# Patient Record
Sex: FEMALE | Race: WHITE | Employment: OTHER | URBAN - METROPOLITAN AREA
[De-identification: names, ages, dates, MRNs, and addresses within clinical notes are randomized per-mention and may not be internally consistent; named-entity substitution may affect disease eponyms.]

---

## 2017-01-23 ENCOUNTER — TELEPHONE (OUTPATIENT)
Dept: INTERNAL MEDICINE CLINIC | Facility: CLINIC | Age: 77
End: 2017-01-23

## 2017-04-05 ENCOUNTER — OFFICE VISIT (OUTPATIENT)
Dept: INTERNAL MEDICINE CLINIC | Facility: CLINIC | Age: 77
End: 2017-04-05

## 2017-04-05 VITALS
WEIGHT: 175 LBS | HEIGHT: 63.75 IN | RESPIRATION RATE: 18 BRPM | OXYGEN SATURATION: 95 % | HEART RATE: 56 BPM | SYSTOLIC BLOOD PRESSURE: 118 MMHG | BODY MASS INDEX: 30.25 KG/M2 | DIASTOLIC BLOOD PRESSURE: 78 MMHG

## 2017-04-05 DIAGNOSIS — Z00.00 MEDICARE ANNUAL WELLNESS VISIT, SUBSEQUENT: Primary | ICD-10-CM

## 2017-04-05 DIAGNOSIS — M54.16 LUMBAR RADICULOPATHY: ICD-10-CM

## 2017-04-05 DIAGNOSIS — L30.8 OTHER ECZEMA: ICD-10-CM

## 2017-04-05 PROCEDURE — G0439 PPPS, SUBSEQ VISIT: HCPCS | Performed by: INTERNAL MEDICINE

## 2017-04-05 PROCEDURE — 96160 PT-FOCUSED HLTH RISK ASSMT: CPT | Performed by: INTERNAL MEDICINE

## 2017-04-05 PROCEDURE — 80061 LIPID PANEL: CPT | Performed by: INTERNAL MEDICINE

## 2017-04-05 PROCEDURE — 82306 VITAMIN D 25 HYDROXY: CPT | Performed by: INTERNAL MEDICINE

## 2017-04-05 PROCEDURE — 85027 COMPLETE CBC AUTOMATED: CPT | Performed by: INTERNAL MEDICINE

## 2017-04-05 PROCEDURE — 80053 COMPREHEN METABOLIC PANEL: CPT | Performed by: INTERNAL MEDICINE

## 2017-04-05 PROCEDURE — 84443 ASSAY THYROID STIM HORMONE: CPT | Performed by: INTERNAL MEDICINE

## 2017-04-05 RX ORDER — CLOBETASOL PROPIONATE 0.5 MG/G
CREAM TOPICAL
Qty: 30 G | Refills: 6 | Status: SHIPPED | OUTPATIENT
Start: 2017-04-05 | End: 2018-06-03

## 2017-04-05 NOTE — PROGRESS NOTES
HPI:   Indiana Munroe is a 68year old female who presents for a MA Supervisit. Patient Active Problem List:     Eczema Clobetasol topically     Arthritis generalized - Tylenol prn for pain HEP for lower back recommended.       General Health     In the 0 - 3 No Risk     Depression Screening (PHQ-2/PHQ-9): Over the LAST 2 WEEKS   Little interest or pleasure in doing things (over the last two weeks)?: Not at all    Feeling down, depressed, or hopeless (over the last two weeks)?: Not at all    PHQ-2 SCORE: and diffuse arthralgiasNEURO: denies headaches  PSYCHE: denies depression or anxiety  HEMATOLOGIC: denies hx of anemia  ENDOCRINE: denies thyroid history  ALL/ASTHMA: denies hx of allergy or asthma    EXAM:   /78 mmHg  Pulse 56  Resp 18  Ht 63.75\" patient indicates understanding of these issues and agrees to the plan.   The patient is asked to return in 6m for fu     [address] Internal Lab or Procedure External Lab or Procedure   Diabetes Screening      HbgA1C any previous visit.  Update Immunization Activity if applicable        SPECIFIC DISEASE MONITORING Internal Lab or Procedure External Lab or Procedure   Annual Monitoring of Persistent     Medications (ACE/ARB, digoxin diuretics, anticonvulsants.)    Aria Risk 3 scheduled doses No orders found for this or any previous visit. SUGGESTED VACCINATIONS - Influenza, Pneumococcal, Zoster, Tetanus     There is no immunization history on file for this patient.     HPI:    Patient ID: Peg Manual is a 68 yea

## 2018-02-19 ENCOUNTER — OFFICE VISIT (OUTPATIENT)
Dept: INTERNAL MEDICINE CLINIC | Facility: CLINIC | Age: 78
End: 2018-02-19

## 2018-02-19 VITALS
WEIGHT: 180.81 LBS | DIASTOLIC BLOOD PRESSURE: 82 MMHG | TEMPERATURE: 99 F | OXYGEN SATURATION: 98 % | RESPIRATION RATE: 18 BRPM | HEIGHT: 63.75 IN | BODY MASS INDEX: 31.25 KG/M2 | SYSTOLIC BLOOD PRESSURE: 164 MMHG | HEART RATE: 65 BPM

## 2018-02-19 DIAGNOSIS — I10 ESSENTIAL HYPERTENSION: Primary | ICD-10-CM

## 2018-02-19 DIAGNOSIS — M77.31 HEEL SPUR, RIGHT: ICD-10-CM

## 2018-02-19 PROCEDURE — 99214 OFFICE O/P EST MOD 30 MIN: CPT | Performed by: INTERNAL MEDICINE

## 2018-02-19 PROCEDURE — 20552 NJX 1/MLT TRIGGER POINT 1/2: CPT | Performed by: INTERNAL MEDICINE

## 2018-02-19 RX ORDER — METHYLPREDNISOLONE ACETATE 40 MG/ML
40 INJECTION, SUSPENSION INTRA-ARTICULAR; INTRALESIONAL; INTRAMUSCULAR; SOFT TISSUE ONCE
Status: COMPLETED | OUTPATIENT
Start: 2018-02-19 | End: 2018-02-19

## 2018-02-19 RX ORDER — LOSARTAN POTASSIUM AND HYDROCHLOROTHIAZIDE 12.5; 5 MG/1; MG/1
1 TABLET ORAL DAILY
Qty: 30 TABLET | Refills: 6 | Status: SHIPPED | OUTPATIENT
Start: 2018-02-19 | End: 2018-10-19

## 2018-02-19 RX ADMIN — METHYLPREDNISOLONE ACETATE 40 MG: 40 INJECTION, SUSPENSION INTRA-ARTICULAR; INTRALESIONAL; INTRAMUSCULAR; SOFT TISSUE at 15:49:00

## 2018-02-19 NOTE — PROGRESS NOTES
HPI:    Patient ID: Rhonda Jenkins is a 68year old female. Pt here for evaluation of elevated BP reading which began after the death of spuse to chronic illness. Has feeling of headache when BP is elevated.   Also has localized right heel pain during am Neck: Normal range of motion. Neck supple. Cardiovascular: Normal rate and regular rhythm. Pulmonary/Chest: Effort normal and breath sounds normal.   Abdominal: Soft. Bowel sounds are normal. There is no tenderness.    Musculoskeletal: Normal range o

## 2018-04-11 ENCOUNTER — TELEPHONE (OUTPATIENT)
Dept: INTERNAL MEDICINE CLINIC | Facility: CLINIC | Age: 78
End: 2018-04-11

## 2018-04-13 ENCOUNTER — OFFICE VISIT (OUTPATIENT)
Dept: INTERNAL MEDICINE CLINIC | Facility: CLINIC | Age: 78
End: 2018-04-13

## 2018-04-13 VITALS
SYSTOLIC BLOOD PRESSURE: 126 MMHG | BODY MASS INDEX: 30.94 KG/M2 | RESPIRATION RATE: 17 BRPM | HEART RATE: 68 BPM | WEIGHT: 179 LBS | HEIGHT: 63.75 IN | DIASTOLIC BLOOD PRESSURE: 76 MMHG | OXYGEN SATURATION: 98 % | TEMPERATURE: 98 F

## 2018-04-13 DIAGNOSIS — Z00.00 MEDICARE ANNUAL WELLNESS VISIT, SUBSEQUENT: Primary | ICD-10-CM

## 2018-04-13 DIAGNOSIS — L30.8 OTHER ECZEMA: ICD-10-CM

## 2018-04-13 DIAGNOSIS — I10 ESSENTIAL HYPERTENSION: ICD-10-CM

## 2018-04-13 DIAGNOSIS — Z12.31 SCREENING MAMMOGRAM, ENCOUNTER FOR: ICD-10-CM

## 2018-04-13 PROCEDURE — 80061 LIPID PANEL: CPT | Performed by: INTERNAL MEDICINE

## 2018-04-13 PROCEDURE — 96160 PT-FOCUSED HLTH RISK ASSMT: CPT | Performed by: INTERNAL MEDICINE

## 2018-04-13 PROCEDURE — 84443 ASSAY THYROID STIM HORMONE: CPT | Performed by: INTERNAL MEDICINE

## 2018-04-13 PROCEDURE — 36415 COLL VENOUS BLD VENIPUNCTURE: CPT | Performed by: INTERNAL MEDICINE

## 2018-04-13 PROCEDURE — 80053 COMPREHEN METABOLIC PANEL: CPT | Performed by: INTERNAL MEDICINE

## 2018-04-13 PROCEDURE — 85025 COMPLETE CBC W/AUTO DIFF WBC: CPT | Performed by: INTERNAL MEDICINE

## 2018-04-13 PROCEDURE — G0439 PPPS, SUBSEQ VISIT: HCPCS | Performed by: INTERNAL MEDICINE

## 2018-04-13 NOTE — PROGRESS NOTES
HPI:    Patient ID: Kiel Baca is a 68year old female. Pt here o MA supervisit and fu on elevated bp and eczema. Has been feeling Sad but not depressed overtly. HPI:   Kiel Baca is a 68year old female who presents for a MA Supervisit.     Pa the last two weeks)?: Not at all    Feeling down, depressed, or hopeless (over the last two weeks)?: Not at all    PHQ-2 SCORE: 0        Advance Directives     Do you have a healthcare power of ?: No    Do you have a living will?: No   Was Medicare incontinence   MUSCULOSKELETAL: denies back pain generalized joint pains  NEURO: denies headaches  PSYCHE: denies depression or anxiety  HEMATOLOGIC: denies hx of anemia  ENDOCRINE: denies thyroid history  ALL/ASTHMA:seassonal allergies or asthma    EXAM: cream and Clobetasol topically bid         The patient indicates understanding of these issues and agrees to the plan.   The patient is asked to return in 5m for fu       [address] Internal Lab or Procedure External Lab Hepatitis B No orders found for this or any previous visit. Update Immunization Activity if applicable    Tetanus No orders found for this or any previous visit.  Update Immunization Activity if applicable        SPECIFIC DISEASE MONITORING Internal Lab or display for this patient. Mammogram Age > 39 q1 year There are no preventive care reminders to display for this patient.    EKG All Patients One at initial exam    Vaccines:      Pneumococcal All Patients Once per lifetime No orders found for this or any

## 2018-04-13 NOTE — PROGRESS NOTES
Pt's  verified  Pt presented for a fasting blood draw. Per DR Rissa Lopez ordered TSHR CBC CMP LIPID. Pt tolerated venipuncture well,specimens drawn from RT  arm  and sent out to [address] [place] [address] lab for testing.

## 2018-04-16 RX ORDER — ATORVASTATIN CALCIUM 10 MG/1
10 TABLET, FILM COATED ORAL DAILY
Qty: 90 TABLET | Refills: 3 | Status: SHIPPED | OUTPATIENT
Start: 2018-04-16 | End: 2019-04-11

## 2018-05-01 RX ORDER — LISINOPRIL 10 MG/1
10 TABLET ORAL DAILY
Qty: 30 TABLET | Refills: 3 | Status: SHIPPED | OUTPATIENT
Start: 2018-05-01 | End: 2018-09-05

## 2018-06-01 RX ORDER — CLOBETASOL PROPIONATE 0.5 MG/ML
LOTION TOPICAL
Qty: 1 BOTTLE | Refills: 1 | Status: SHIPPED | OUTPATIENT
Start: 2018-06-01

## 2018-06-04 RX ORDER — CLOBETASOL PROPIONATE 0.5 MG/G
EMULSION TOPICAL
Qty: 30 G | Refills: 0 | Status: SHIPPED | OUTPATIENT
Start: 2018-06-04 | End: 2018-10-19

## 2018-06-05 ENCOUNTER — TELEPHONE (OUTPATIENT)
Dept: INTERNAL MEDICINE CLINIC | Facility: CLINIC | Age: 78
End: 2018-06-05

## 2018-06-05 RX ORDER — CLOBETASOL PROPIONATE 0.5 MG/G
CREAM TOPICAL
Qty: 30 G | Refills: 0 | Status: CANCELLED | OUTPATIENT
Start: 2018-06-05

## 2018-09-05 RX ORDER — LISINOPRIL 10 MG/1
TABLET ORAL
Qty: 30 TABLET | Refills: 0 | Status: SHIPPED | OUTPATIENT
Start: 2018-09-05 | End: 2018-10-04

## 2018-09-29 ENCOUNTER — HOSPITAL ENCOUNTER (OUTPATIENT)
Dept: MAMMOGRAPHY | Facility: HOSPITAL | Age: 78
Discharge: HOME | End: 2018-09-29
Attending: INTERNAL MEDICINE
Payer: MEDICARE

## 2018-09-29 DIAGNOSIS — Z12.31 SCREENING MAMMOGRAM, ENCOUNTER FOR: ICD-10-CM

## 2018-09-29 PROCEDURE — 77063 BREAST TOMOSYNTHESIS BI: CPT | Performed by: INTERNAL MEDICINE

## 2018-09-29 PROCEDURE — 77067 SCR MAMMO BI INCL CAD: CPT | Performed by: INTERNAL MEDICINE

## 2018-10-04 RX ORDER — LISINOPRIL 10 MG/1
TABLET ORAL
Qty: 30 TABLET | Refills: 0 | Status: SHIPPED | OUTPATIENT
Start: 2018-10-04 | End: 2018-10-19

## 2018-10-19 ENCOUNTER — OFFICE VISIT (OUTPATIENT)
Dept: INTERNAL MEDICINE CLINIC | Facility: CLINIC | Age: 78
End: 2018-10-19

## 2018-10-19 VITALS
WEIGHT: 181 LBS | OXYGEN SATURATION: 99 % | DIASTOLIC BLOOD PRESSURE: 70 MMHG | TEMPERATURE: 98 F | SYSTOLIC BLOOD PRESSURE: 120 MMHG | HEART RATE: 62 BPM | HEIGHT: 64 IN | BODY MASS INDEX: 30.9 KG/M2

## 2018-10-19 DIAGNOSIS — I10 ESSENTIAL HYPERTENSION: Primary | ICD-10-CM

## 2018-10-19 DIAGNOSIS — E78.2 MIXED HYPERLIPIDEMIA: ICD-10-CM

## 2018-10-19 DIAGNOSIS — L30.8 OTHER ECZEMA: ICD-10-CM

## 2018-10-19 PROCEDURE — G0008 ADMIN INFLUENZA VIRUS VAC: HCPCS | Performed by: INTERNAL MEDICINE

## 2018-10-19 PROCEDURE — 80061 LIPID PANEL: CPT | Performed by: INTERNAL MEDICINE

## 2018-10-19 PROCEDURE — 80053 COMPREHEN METABOLIC PANEL: CPT | Performed by: INTERNAL MEDICINE

## 2018-10-19 PROCEDURE — 99214 OFFICE O/P EST MOD 30 MIN: CPT | Performed by: INTERNAL MEDICINE

## 2018-10-19 PROCEDURE — 90653 IIV ADJUVANT VACCINE IM: CPT | Performed by: INTERNAL MEDICINE

## 2018-10-19 RX ORDER — CLOBETASOL PROPIONATE 0.5 MG/G
CREAM TOPICAL
Qty: 30 G | Refills: 3 | Status: SHIPPED | OUTPATIENT
Start: 2018-10-19 | End: 2019-09-27

## 2018-10-19 RX ORDER — LOSARTAN POTASSIUM AND HYDROCHLOROTHIAZIDE 12.5; 5 MG/1; MG/1
1 TABLET ORAL DAILY
Qty: 90 TABLET | Refills: 3 | Status: SHIPPED | OUTPATIENT
Start: 2018-10-19 | End: 2018-10-19 | Stop reason: CLARIF

## 2018-10-19 RX ORDER — LISINOPRIL 10 MG/1
10 TABLET ORAL DAILY
Qty: 90 TABLET | Refills: 3 | Status: SHIPPED | OUTPATIENT
Start: 2018-10-19 | End: 2019-09-27

## 2018-10-19 NOTE — PROGRESS NOTES
HPI:    Patient ID: Rhonda Appiah is a 68year old female. Pt here for fu on htn and hyperlipidemia . Has good tolerance of both Losartan and Statin rx. Is planning to travel to [place] in near future. Is due for fu blood work on cholesterol level. mood and affect.               ASSESSMENT/PLAN:   Essential hypertension  (primary encounter diagnosis) controlled with Lisinopril 10mg  Mixed hyperlipidemia  On lipitor recheck labs  Other eczema Triamcinolone topically  Fluad given left deltoid  No orders

## 2019-06-26 RX ORDER — ATORVASTATIN CALCIUM 10 MG/1
TABLET, FILM COATED ORAL
Qty: 90 TABLET | Refills: 1 | Status: SHIPPED | OUTPATIENT
Start: 2019-06-26 | End: 2019-09-27

## 2019-09-13 ENCOUNTER — TELEPHONE (OUTPATIENT)
Dept: INTERNAL MEDICINE CLINIC | Facility: CLINIC | Age: 79
End: 2019-09-13

## 2019-09-13 NOTE — TELEPHONE ENCOUNTER
Patient has an appointment on Friday, 9/27 at 8:30 am with Dr. Chadwick Wright for her annual physical.    She's not sure if she needs to have blood work done before her appointment?

## 2019-09-27 ENCOUNTER — OFFICE VISIT (OUTPATIENT)
Dept: INTERNAL MEDICINE CLINIC | Facility: CLINIC | Age: 79
End: 2019-09-27
Payer: MEDICARE

## 2019-09-27 VITALS
WEIGHT: 177.19 LBS | HEART RATE: 66 BPM | HEIGHT: 63.75 IN | SYSTOLIC BLOOD PRESSURE: 112 MMHG | BODY MASS INDEX: 30.62 KG/M2 | DIASTOLIC BLOOD PRESSURE: 74 MMHG | OXYGEN SATURATION: 98 %

## 2019-09-27 DIAGNOSIS — I10 ESSENTIAL HYPERTENSION: ICD-10-CM

## 2019-09-27 DIAGNOSIS — E78.2 MIXED HYPERLIPIDEMIA: ICD-10-CM

## 2019-09-27 DIAGNOSIS — Z00.00 MEDICARE ANNUAL WELLNESS VISIT, SUBSEQUENT: Primary | ICD-10-CM

## 2019-09-27 DIAGNOSIS — Z01.00 ENCOUNTER FOR COMPLETE EYE EXAM: ICD-10-CM

## 2019-09-27 PROCEDURE — G0439 PPPS, SUBSEQ VISIT: HCPCS | Performed by: INTERNAL MEDICINE

## 2019-09-27 PROCEDURE — 96160 PT-FOCUSED HLTH RISK ASSMT: CPT | Performed by: INTERNAL MEDICINE

## 2019-09-27 PROCEDURE — 99397 PER PM REEVAL EST PAT 65+ YR: CPT | Performed by: INTERNAL MEDICINE

## 2019-09-27 RX ORDER — CLOBETASOL PROPIONATE 0.5 MG/G
CREAM TOPICAL
Qty: 30 G | Refills: 3 | Status: SHIPPED | OUTPATIENT
Start: 2019-09-27 | End: 2021-05-21

## 2019-09-27 RX ORDER — CLOBETASOL PROPIONATE 0.5 MG/G
CREAM TOPICAL
Qty: 30 G | Refills: 3 | Status: SHIPPED | OUTPATIENT
Start: 2019-09-27 | End: 2019-09-27

## 2019-09-27 RX ORDER — LISINOPRIL 10 MG/1
10 TABLET ORAL DAILY
Qty: 90 TABLET | Refills: 3 | Status: SHIPPED | OUTPATIENT
Start: 2019-09-27 | End: 2021-08-11

## 2019-09-27 RX ORDER — ATORVASTATIN CALCIUM 10 MG/1
10 TABLET, FILM COATED ORAL
Qty: 90 TABLET | Refills: 3 | Status: SHIPPED | OUTPATIENT
Start: 2019-09-27 | End: 2019-09-27

## 2019-09-27 RX ORDER — ATORVASTATIN CALCIUM 10 MG/1
10 TABLET, FILM COATED ORAL
Qty: 90 TABLET | Refills: 3 | Status: SHIPPED | OUTPATIENT
Start: 2019-09-27 | End: 2021-02-04

## 2019-09-27 RX ORDER — LISINOPRIL 10 MG/1
10 TABLET ORAL DAILY
Qty: 90 TABLET | Refills: 3 | Status: SHIPPED | OUTPATIENT
Start: 2019-09-27 | End: 2020-09-21

## 2019-09-27 NOTE — PROGRESS NOTES
HPI:    Patient ID: Shwetha Grimes is a 66year old female. HPI Pt here for MA supervisit and fu on OA , lipids and htn. Is generally feeling pretty well except for joint pains. Bp is well controlled.     HPI:   Shwetha Grimes is a 66year old female who Smoker      Smokeless tobacco: Never Used    Alcohol use:  Yes      Alcohol/week: 0.0 standard drinks      Comment: Social    Drug use: No    Occ: retired : yes      REVIEW OF SYSTEMS:   GENERAL: feels well otherwise  SKIN: denies any unusual skin le CHRONIC CONDITIONS:   Slade Matos is a 66year old female who presents for a Medicare Assessment.      PLAN SUMMARY:   Diagnoses and all orders for this visit:    Medicare annual wellness visit, subsequent    Essential hypertension    Mixed hyperlipidemia Persistent     Medications (ACE/ARB, digoxin diuretics, anticonvulsants.)    Potassium  Annually Potassium (mmol/L)   Date Value   10/19/2018 3.8     POTASSIUM (P) (mmol/L)   Date Value   03/07/2016 4.4    No flowsheet data found.     Creatinine  Annually C (primary encounter diagnosis) had labs and mammogram  Essential hypertension controlled  Mixed hyperlipidemia on statin    No orders of the defined types were placed in this encounter.       Meds This Visit:  Requested Prescriptions      No prescriptions re

## 2019-09-28 LAB
ABSOLUTE BASOPHILS: 52 CELLS/UL (ref 0–200)
ABSOLUTE EOSINOPHILS: 142 CELLS/UL (ref 15–500)
ABSOLUTE LYMPHOCYTES: 1707 CELLS/UL (ref 850–3900)
ABSOLUTE MONOCYTES: 400 CELLS/UL (ref 200–950)
ABSOLUTE NEUTROPHILS: 2000 CELLS/UL (ref 1500–7800)
ALBUMIN/GLOBULIN RATIO: 1.7 (CALC) (ref 1–2.5)
ALBUMIN: 4.2 G/DL (ref 3.6–5.1)
ALKALINE PHOSPHATASE: 57 U/L (ref 33–130)
ALT: 21 U/L (ref 6–29)
AST: 19 U/L (ref 10–35)
BASOPHILS: 1.2 %
BILIRUBIN, TOTAL: 1.4 MG/DL (ref 0.2–1.2)
BUN: 16 MG/DL (ref 7–25)
CALCIUM: 9.4 MG/DL (ref 8.6–10.4)
CARBON DIOXIDE: 31 MMOL/L (ref 20–32)
CHLORIDE: 105 MMOL/L (ref 98–110)
CHOL/HDLC RATIO: 3.9 (CALC)
CHOLESTEROL, TOTAL: 208 MG/DL
CREATININE: 0.84 MG/DL (ref 0.6–0.93)
EGFR IF AFRICN AM: 77 ML/MIN/1.73M2
EGFR IF NONAFRICN AM: 67 ML/MIN/1.73M2
EOSINOPHILS: 3.3 %
GLOBULIN: 2.5 G/DL (CALC) (ref 1.9–3.7)
GLUCOSE: 88 MG/DL (ref 65–99)
HDL CHOLESTEROL: 54 MG/DL
HEMATOCRIT: 38.7 % (ref 35–45)
HEMOGLOBIN: 12.6 G/DL (ref 11.7–15.5)
LDL-CHOLESTEROL: 134 MG/DL (CALC)
LYMPHOCYTES: 39.7 %
MCH: 30.4 PG (ref 27–33)
MCHC: 32.6 G/DL (ref 32–36)
MCV: 93.5 FL (ref 80–100)
MONOCYTES: 9.3 %
MPV: 9.8 FL (ref 7.5–12.5)
NEUTROPHILS: 46.5 %
NON-HDL CHOLESTEROL: 154 MG/DL (CALC)
PLATELET COUNT: 212 THOUSAND/UL (ref 140–400)
POTASSIUM: 4.6 MMOL/L (ref 3.5–5.3)
PROTEIN, TOTAL: 6.7 G/DL (ref 6.1–8.1)
RDW: 12.1 % (ref 11–15)
RED BLOOD CELL COUNT: 4.14 MILLION/UL (ref 3.8–5.1)
SODIUM: 140 MMOL/L (ref 135–146)
TRIGLYCERIDES: 94 MG/DL
WHITE BLOOD CELL COUNT: 4.3 THOUSAND/UL (ref 3.8–10.8)

## 2019-11-15 ENCOUNTER — HOSPITAL ENCOUNTER (OUTPATIENT)
Age: 79
Discharge: HOME | End: 2019-11-15
Attending: EMERGENCY MEDICINE
Payer: MEDICARE

## 2019-11-15 VITALS
BODY MASS INDEX: 29.02 KG/M2 | HEIGHT: 64 IN | HEART RATE: 76 BPM | TEMPERATURE: 98 F | RESPIRATION RATE: 16 BRPM | SYSTOLIC BLOOD PRESSURE: 131 MMHG | WEIGHT: 170 LBS | OXYGEN SATURATION: 98 % | DIASTOLIC BLOOD PRESSURE: 68 MMHG

## 2019-11-15 DIAGNOSIS — H10.12 ALLERGIC CONJUNCTIVITIS OF LEFT EYE: Primary | ICD-10-CM

## 2019-11-15 PROCEDURE — 99203 OFFICE O/P NEW LOW 30 MIN: CPT

## 2019-11-15 PROCEDURE — 99213 OFFICE O/P EST LOW 20 MIN: CPT

## 2019-11-15 RX ORDER — KETOTIFEN FUMARATE 0.35 MG/ML
1 SOLUTION/ DROPS OPHTHALMIC 2 TIMES DAILY
Qty: 1 BOTTLE | Refills: 0 | Status: SHIPPED | OUTPATIENT
Start: 2019-11-15

## 2019-11-15 RX ORDER — POLYMYXIN B SULFATE AND TRIMETHOPRIM 1; 10000 MG/ML; [USP'U]/ML
1 SOLUTION OPHTHALMIC EVERY 6 HOURS
Qty: 10 ML | Refills: 0 | Status: SHIPPED | OUTPATIENT
Start: 2019-11-15 | End: 2019-11-20

## 2019-11-15 NOTE — ED PROVIDER NOTES
Patient Seen in: [facility] [address] [place] [address]    History   Patient presents with:  Eye Problem    Stated Complaint: Eye Problem    HPI     Pt complains of red left eye  for 2 days . No trauma. Denies any Uri symptoms. no visual changes. 98 %   O2 Device None (Room air)       Current:/68   Pulse 76   Temp 98.3 °F (36.8 °C) (Oral)   Resp 16   Ht 162.6 cm (5' 4\")   Wt 77.1 kg   SpO2 98%   BMI 29.18 kg/m²     Right Eye Chart Acuity: 20/70, Corrected  Left Eye Chart Acuity: 20/70, Corre

## 2020-03-26 ENCOUNTER — HOSPITAL ENCOUNTER (EMERGENCY)
Facility: HOSPITAL | Age: 80
Discharge: HOME | End: 2020-03-26
Attending: EMERGENCY MEDICINE
Payer: MEDICARE

## 2020-03-26 ENCOUNTER — APPOINTMENT (OUTPATIENT)
Dept: GENERAL RADIOLOGY | Facility: HOSPITAL | Age: 80
End: 2020-03-26
Attending: EMERGENCY MEDICINE
Payer: MEDICARE

## 2020-03-26 VITALS
SYSTOLIC BLOOD PRESSURE: 119 MMHG | TEMPERATURE: 98 F | HEART RATE: 81 BPM | OXYGEN SATURATION: 97 % | HEIGHT: 64 IN | RESPIRATION RATE: 16 BRPM | DIASTOLIC BLOOD PRESSURE: 88 MMHG | BODY MASS INDEX: 29.88 KG/M2 | WEIGHT: 175 LBS

## 2020-03-26 DIAGNOSIS — N30.00 ACUTE CYSTITIS WITHOUT HEMATURIA: Primary | ICD-10-CM

## 2020-03-26 LAB
ALBUMIN SERPL-MCNC: 3.7 G/DL (ref 3.4–5)
ALP LIVER SERPL-CCNC: 70 U/L (ref 55–142)
ALT SERPL-CCNC: 25 U/L (ref 13–56)
ANION GAP SERPL CALC-SCNC: 5 MMOL/L (ref 0–18)
AST SERPL-CCNC: 15 U/L (ref 15–37)
BASOPHILS # BLD AUTO: 0.03 X10(3) UL (ref 0–0.2)
BASOPHILS NFR BLD AUTO: 0.6 %
BILIRUB DIRECT SERPL-MCNC: 0.2 MG/DL (ref 0–0.2)
BILIRUB SERPL-MCNC: 0.9 MG/DL (ref 0.1–2)
BILIRUB UR QL: NEGATIVE
BUN BLD-MCNC: 19 MG/DL (ref 7–18)
BUN/CREAT SERPL: 22.1 (ref 10–20)
CALCIUM BLD-MCNC: 9.4 MG/DL (ref 8.5–10.1)
CHLORIDE SERPL-SCNC: 107 MMOL/L (ref 98–112)
CLARITY UR: CLEAR
CO2 SERPL-SCNC: 27 MMOL/L (ref 21–32)
COLOR UR: COLORLESS
CREAT BLD-MCNC: 0.86 MG/DL (ref 0.55–1.02)
DEPRECATED RDW RBC AUTO: 37.3 FL (ref 35.1–46.3)
EOSINOPHIL # BLD AUTO: 0.1 X10(3) UL (ref 0–0.7)
EOSINOPHIL NFR BLD AUTO: 1.9 %
ERYTHROCYTE [DISTWIDTH] IN BLOOD BY AUTOMATED COUNT: 11.7 % (ref 11–15)
GLUCOSE BLD-MCNC: 96 MG/DL (ref 70–99)
GLUCOSE BLDC GLUCOMTR-MCNC: 102 MG/DL (ref 70–99)
GLUCOSE UR-MCNC: NEGATIVE MG/DL
HCT VFR BLD AUTO: 36.7 % (ref 35–48)
HGB BLD-MCNC: 12.7 G/DL (ref 12–16)
HGB UR QL STRIP.AUTO: NEGATIVE
IMM GRANULOCYTES # BLD AUTO: 0.01 X10(3) UL (ref 0–1)
IMM GRANULOCYTES NFR BLD: 0.2 %
KETONES UR-MCNC: NEGATIVE MG/DL
LYMPHOCYTES # BLD AUTO: 1.92 X10(3) UL (ref 1–4)
LYMPHOCYTES NFR BLD AUTO: 36.7 %
M PROTEIN MFR SERPL ELPH: 7.2 G/DL (ref 6.4–8.2)
MCH RBC QN AUTO: 30.8 PG (ref 26–34)
MCHC RBC AUTO-ENTMCNC: 34.6 G/DL (ref 31–37)
MCV RBC AUTO: 89.1 FL (ref 80–100)
MONOCYTES # BLD AUTO: 0.49 X10(3) UL (ref 0.1–1)
MONOCYTES NFR BLD AUTO: 9.4 %
NEUTROPHILS # BLD AUTO: 2.68 X10 (3) UL (ref 1.5–7.7)
NEUTROPHILS # BLD AUTO: 2.68 X10(3) UL (ref 1.5–7.7)
NEUTROPHILS NFR BLD AUTO: 51.2 %
NITRITE UR QL STRIP.AUTO: NEGATIVE
OSMOLALITY SERPL CALC.SUM OF ELEC: 290 MOSM/KG (ref 275–295)
PH UR: 6 [PH] (ref 5–8)
PLATELET # BLD AUTO: 205 10(3)UL (ref 150–450)
POTASSIUM SERPL-SCNC: 3.8 MMOL/L (ref 3.5–5.1)
PROT UR-MCNC: NEGATIVE MG/DL
RBC # BLD AUTO: 4.12 X10(6)UL (ref 3.8–5.3)
RBC #/AREA URNS AUTO: 2 /HPF
SODIUM SERPL-SCNC: 139 MMOL/L (ref 136–145)
SP GR UR STRIP: 1 (ref 1–1.03)
TROPONIN I SERPL-MCNC: <0.045 NG/ML (ref ?–0.04)
UROBILINOGEN UR STRIP-ACNC: <2
WBC # BLD AUTO: 5.2 X10(3) UL (ref 4–11)
WBC #/AREA URNS AUTO: 4 /HPF

## 2020-03-26 PROCEDURE — 82962 GLUCOSE BLOOD TEST: CPT

## 2020-03-26 PROCEDURE — 99284 EMERGENCY DEPT VISIT MOD MDM: CPT

## 2020-03-26 PROCEDURE — 84484 ASSAY OF TROPONIN QUANT: CPT | Performed by: EMERGENCY MEDICINE

## 2020-03-26 PROCEDURE — 85025 COMPLETE CBC W/AUTO DIFF WBC: CPT | Performed by: EMERGENCY MEDICINE

## 2020-03-26 PROCEDURE — 93005 ELECTROCARDIOGRAM TRACING: CPT

## 2020-03-26 PROCEDURE — 93010 ELECTROCARDIOGRAM REPORT: CPT | Performed by: EMERGENCY MEDICINE

## 2020-03-26 PROCEDURE — 81001 URINALYSIS AUTO W/SCOPE: CPT | Performed by: EMERGENCY MEDICINE

## 2020-03-26 PROCEDURE — 80076 HEPATIC FUNCTION PANEL: CPT | Performed by: EMERGENCY MEDICINE

## 2020-03-26 PROCEDURE — 71045 X-RAY EXAM CHEST 1 VIEW: CPT | Performed by: EMERGENCY MEDICINE

## 2020-03-26 PROCEDURE — 80048 BASIC METABOLIC PNL TOTAL CA: CPT | Performed by: EMERGENCY MEDICINE

## 2020-03-26 PROCEDURE — 36415 COLL VENOUS BLD VENIPUNCTURE: CPT

## 2020-03-26 RX ORDER — CEPHALEXIN 500 MG/1
500 CAPSULE ORAL 3 TIMES DAILY
Qty: 15 CAPSULE | Refills: 0 | Status: SHIPPED | OUTPATIENT
Start: 2020-03-26 | End: 2020-03-31

## 2020-03-27 NOTE — ED PROVIDER NOTES
Patient Seen in: [facility] Emergency Department    History   Patient presents with:  Fatigue    Stated Complaint:     HPI    Patient complains of gen malaise and weakness for wfew days. No fever. No cp. No sob. No abd pain . no rash. Jeremiah Alicea Current:/88   Pulse 81   Temp 97.9 °F (36.6 °C) (Oral)   Resp 16   Ht 162.6 cm (5' 4\")   Wt 79.4 kg   SpO2 97%   BMI 30.04 kg/m²    PULSE OX Nl on room air    GENERAL: awake alert no sig distress  HEAD: normocephalic, atraumatic,   EYES: PERRL DIFFERENTIAL     EKG    Rate, intervals and axes as noted on EKG Report.   Rate: 80  Rhythm: Sinus Rhythm  Reading: Normal intervals, normal EKG             MDM       Cardiac Monitor: Pulse Readings from Last 1 Encounters:  03/26/20 : 81  , sinus,      Radi for 5 days. , Normal, Disp-15 capsule, R-0    !! cephALEXin 500 MG Oral Cap  Take 1 capsule (500 mg total) by mouth 3 (three) times daily for 5 days. , Normal, Disp-15 capsule, R-0    !! - Potential duplicate medications found. Please discuss with provider.

## 2021-02-02 RX ORDER — ATORVASTATIN CALCIUM 10 MG/1
TABLET, FILM COATED ORAL
Qty: 90 TABLET | Refills: 3 | OUTPATIENT
Start: 2021-02-02

## 2021-02-04 RX ORDER — ATORVASTATIN CALCIUM 10 MG/1
TABLET, FILM COATED ORAL
Qty: 90 TABLET | Refills: 3 | Status: SHIPPED | OUTPATIENT
Start: 2021-02-04

## 2021-03-08 DIAGNOSIS — Z23 NEED FOR VACCINATION: ICD-10-CM

## 2021-05-21 ENCOUNTER — OFFICE VISIT (OUTPATIENT)
Dept: INTERNAL MEDICINE CLINIC | Facility: CLINIC | Age: 81
End: 2021-05-21
Payer: MEDICARE

## 2021-05-21 VITALS
HEART RATE: 63 BPM | WEIGHT: 175.81 LBS | BODY MASS INDEX: 30.02 KG/M2 | DIASTOLIC BLOOD PRESSURE: 74 MMHG | OXYGEN SATURATION: 98 % | SYSTOLIC BLOOD PRESSURE: 124 MMHG | HEIGHT: 64 IN

## 2021-05-21 DIAGNOSIS — Z00.00 MEDICARE ANNUAL WELLNESS VISIT, SUBSEQUENT: Primary | ICD-10-CM

## 2021-05-21 DIAGNOSIS — E78.2 MIXED HYPERLIPIDEMIA: ICD-10-CM

## 2021-05-21 DIAGNOSIS — I10 ESSENTIAL HYPERTENSION: ICD-10-CM

## 2021-05-21 DIAGNOSIS — M47.816 LUMBAR SPONDYLOSIS: ICD-10-CM

## 2021-05-21 DIAGNOSIS — M25.561 ACUTE PAIN OF RIGHT KNEE: ICD-10-CM

## 2021-05-21 PROCEDURE — 3074F SYST BP LT 130 MM HG: CPT | Performed by: INTERNAL MEDICINE

## 2021-05-21 PROCEDURE — G0439 PPPS, SUBSEQ VISIT: HCPCS | Performed by: INTERNAL MEDICINE

## 2021-05-21 PROCEDURE — 96160 PT-FOCUSED HLTH RISK ASSMT: CPT | Performed by: INTERNAL MEDICINE

## 2021-05-21 PROCEDURE — 3078F DIAST BP <80 MM HG: CPT | Performed by: INTERNAL MEDICINE

## 2021-05-21 PROCEDURE — 99397 PER PM REEVAL EST PAT 65+ YR: CPT | Performed by: INTERNAL MEDICINE

## 2021-05-21 PROCEDURE — 3008F BODY MASS INDEX DOCD: CPT | Performed by: INTERNAL MEDICINE

## 2021-05-21 RX ORDER — MELOXICAM 15 MG/1
15 TABLET ORAL DAILY
Qty: 30 TABLET | Refills: 5 | Status: SHIPPED | OUTPATIENT
Start: 2021-05-21

## 2021-05-21 RX ORDER — CLOBETASOL PROPIONATE 0.5 MG/G
CREAM TOPICAL
Qty: 30 G | Refills: 3 | Status: SHIPPED | OUTPATIENT
Start: 2021-05-21

## 2021-05-21 NOTE — PROGRESS NOTES
HPI/Subjective:   Patient ID: Greta Miller is a [de-identified]year old female.   History/Other:   Review of Systems  Current Outpatient Medications   Medication Sig Dispense Refill   • ATORVASTATIN 10 MG Oral Tab TAKE 1 TABLET BY MOUTH EVERY DAY 90 tablet 3   • lisin

## 2021-05-21 NOTE — PROGRESS NOTES
HPI/Subjective:   Patient ID: Nando Brock is a [de-identified]year old female. HPI Pt here for a MA supervisit. Having problems with occasional heartburn and lower back and right knee pains. Has labile bp readings.     HPI:   Nando Brock is a [de-identified]year old female Advance Directives     Do you have a healthcare power of ?: No    Do you have a living will?: No   Was Medicare Assessment Questionnaire completed by patient and sent to HIM for scanning? Yes    Please go to \"Cognitive Assessment\" under Medica nasal congestion, sinus pain or ST  LUNGS: denies shortness of breath with exertion  CARDIOVASCULAR: denies chest pain on exertion  GI: denies abdominal pain, occheartburn  : denies dysuria, vaginal discharge or itching, no complaint of urinary incontine COMP METABOLIC PANEL (14)  -     LIPID PANEL    Lumbar spondylosis  -     XR LUMBAR SPINE (MIN 4 VIEWS) (CPT=72410); Future    Acute pain of right knee  -     XR KNEE ROUTINE (3 VIEWS), RIGHT (CPT=73273);  Future    Other orders  -     Meloxicam 15 MG Oral Screening Mammogram      Mammogram  Annually There are no preventive care reminders to display for this patient. Update Health Maintenance if applicable   Immunizations      Influenza No orders found for this or any previous visit.  Update Immunization Ac 09/27/2019 134 (H)      Colonoscopy All Patients q10 years There are no preventive care reminders to display for this patient.    FBS All Patients q1 year No results found for: GLUCOSE   Glaucoma If at high risk q1 year    Pap If at high risk q1 year Ther (primary encounter diagnosis) check labs  Essential hypertension on lisinopril  Mixed hyperlipidemia on lipitor  Lumbar spondylosis meloxicam  Acute pain of right knee check xray    Orders Placed This Encounter      Comp Metabolic Panel (14) [E]      Lipid

## 2021-05-21 NOTE — PROGRESS NOTES
HPI/Subjective:   Patient ID: Zayda Shankar is a [de-identified]year old female.     HPI    History/Other:   Review of Systems  Current Outpatient Medications   Medication Sig Dispense Refill   • ATORVASTATIN 10 MG Oral Tab TAKE 1 TABLET BY MOUTH EVERY DAY 90 tablet 3

## 2021-05-24 ENCOUNTER — HOSPITAL ENCOUNTER (OUTPATIENT)
Dept: GENERAL RADIOLOGY | Age: 81
Discharge: HOME | End: 2021-05-24
Attending: INTERNAL MEDICINE
Payer: MEDICARE

## 2021-05-24 DIAGNOSIS — M25.561 ACUTE PAIN OF RIGHT KNEE: ICD-10-CM

## 2021-05-24 DIAGNOSIS — M47.816 LUMBAR SPONDYLOSIS: ICD-10-CM

## 2021-05-24 PROCEDURE — 72110 X-RAY EXAM L-2 SPINE 4/>VWS: CPT | Performed by: INTERNAL MEDICINE

## 2021-05-24 PROCEDURE — 73562 X-RAY EXAM OF KNEE 3: CPT | Performed by: INTERNAL MEDICINE

## 2021-05-28 ENCOUNTER — LAB ENCOUNTER (OUTPATIENT)
Dept: LAB | Facility: REFERENCE LAB | Age: 81
End: 2021-05-28
Attending: INTERNAL MEDICINE
Payer: MEDICARE

## 2021-05-28 DIAGNOSIS — Z86.16 HISTORY OF COVID-19: ICD-10-CM

## 2021-05-28 PROCEDURE — 86769 SARS-COV-2 COVID-19 ANTIBODY: CPT

## 2021-05-28 PROCEDURE — 36415 COLL VENOUS BLD VENIPUNCTURE: CPT

## 2021-08-09 NOTE — ED INITIAL ASSESSMENT (HPI)
Pt reports feeling \"woozy in the head\" and feeling like she will \"pass out\". Denies SOB, STEVEN, fevers, cough. Pt is [place] speaking only. Adult daughter at bedside to translate. R/F Liudmila

## 2021-08-11 RX ORDER — LISINOPRIL 10 MG/1
10 TABLET ORAL DAILY
Qty: 90 TABLET | Refills: 0 | Status: SHIPPED | OUTPATIENT
Start: 2021-08-11 | End: 2021-12-09

## 2021-12-08 NOTE — TELEPHONE ENCOUNTER
A refill request was received for:  Requested Prescriptions     Pending Prescriptions Disp Refills   • LISINOPRIL 10 MG Oral Tab [Pharmacy Med Name: LISINOPRIL 10 MG TABLET] 90 tablet 0     Sig: TAKE 1 TABLET BY MOUTH EVERY DAY     Last refill date: 8/11/2

## 2021-12-09 RX ORDER — LISINOPRIL 10 MG/1
TABLET ORAL
Qty: 90 TABLET | Refills: 0 | Status: SHIPPED | OUTPATIENT
Start: 2021-12-09

## 2022-03-09 RX ORDER — LISINOPRIL 10 MG/1
TABLET ORAL
Qty: 90 TABLET | Refills: 0 | Status: SHIPPED | OUTPATIENT
Start: 2022-03-09

## 2022-05-19 ENCOUNTER — OFFICE VISIT (OUTPATIENT)
Dept: INTERNAL MEDICINE CLINIC | Facility: CLINIC | Age: 82
End: 2022-05-19
Payer: MEDICARE

## 2022-05-19 VITALS
WEIGHT: 180 LBS | DIASTOLIC BLOOD PRESSURE: 70 MMHG | SYSTOLIC BLOOD PRESSURE: 122 MMHG | OXYGEN SATURATION: 98 % | HEART RATE: 81 BPM | BODY MASS INDEX: 30.73 KG/M2 | HEIGHT: 64 IN

## 2022-05-19 DIAGNOSIS — Z00.00 MEDICARE ANNUAL WELLNESS VISIT, SUBSEQUENT: Primary | ICD-10-CM

## 2022-05-19 DIAGNOSIS — M25.561 ACUTE PAIN OF RIGHT KNEE: ICD-10-CM

## 2022-05-19 DIAGNOSIS — I10 ESSENTIAL HYPERTENSION: ICD-10-CM

## 2022-05-19 PROCEDURE — 96160 PT-FOCUSED HLTH RISK ASSMT: CPT | Performed by: INTERNAL MEDICINE

## 2022-05-19 PROCEDURE — 3078F DIAST BP <80 MM HG: CPT | Performed by: INTERNAL MEDICINE

## 2022-05-19 PROCEDURE — 3008F BODY MASS INDEX DOCD: CPT | Performed by: INTERNAL MEDICINE

## 2022-05-19 PROCEDURE — 99397 PER PM REEVAL EST PAT 65+ YR: CPT | Performed by: INTERNAL MEDICINE

## 2022-05-19 PROCEDURE — G0439 PPPS, SUBSEQ VISIT: HCPCS | Performed by: INTERNAL MEDICINE

## 2022-05-19 PROCEDURE — 3074F SYST BP LT 130 MM HG: CPT | Performed by: INTERNAL MEDICINE

## 2022-05-19 RX ORDER — AMLODIPINE BESYLATE 2.5 MG/1
2.5 TABLET ORAL DAILY
Qty: 30 TABLET | Refills: 11 | Status: SHIPPED | OUTPATIENT
Start: 2022-05-19

## 2022-05-19 RX ORDER — CLOBETASOL PROPIONATE 0.5 MG/G
CREAM TOPICAL
Qty: 30 G | Refills: 3 | Status: SHIPPED | OUTPATIENT
Start: 2022-05-19

## 2022-05-19 RX ORDER — LISINOPRIL 10 MG/1
10 TABLET ORAL DAILY
Qty: 90 TABLET | Refills: 3 | Status: SHIPPED | OUTPATIENT
Start: 2022-05-19

## 2022-05-23 ENCOUNTER — LAB ENCOUNTER (OUTPATIENT)
Dept: LAB | Facility: REFERENCE LAB | Age: 82
End: 2022-05-23
Attending: INTERNAL MEDICINE
Payer: MEDICARE

## 2022-05-23 DIAGNOSIS — Z00.00 MEDICARE ANNUAL WELLNESS VISIT, SUBSEQUENT: ICD-10-CM

## 2022-05-23 DIAGNOSIS — Z00.00 ROUTINE GENERAL MEDICAL EXAMINATION AT A HEALTH CARE FACILITY: Primary | ICD-10-CM

## 2022-05-23 LAB
ALBUMIN SERPL-MCNC: 3.7 G/DL (ref 3.4–5)
ALBUMIN/GLOB SERPL: 1 {RATIO} (ref 1–2)
ALP LIVER SERPL-CCNC: 55 U/L
ALT SERPL-CCNC: 22 U/L
ANION GAP SERPL CALC-SCNC: 2 MMOL/L (ref 0–18)
AST SERPL-CCNC: 16 U/L (ref 15–37)
BASOPHILS # BLD AUTO: 0.05 X10(3) UL (ref 0–0.2)
BASOPHILS NFR BLD AUTO: 1.2 %
BILIRUB SERPL-MCNC: 1.3 MG/DL (ref 0.1–2)
BUN BLD-MCNC: 14 MG/DL (ref 7–18)
BUN/CREAT SERPL: 16.3 (ref 10–20)
CALCIUM BLD-MCNC: 9.6 MG/DL (ref 8.5–10.1)
CHLORIDE SERPL-SCNC: 108 MMOL/L (ref 98–112)
CHOLEST SERPL-MCNC: 203 MG/DL (ref ?–200)
CO2 SERPL-SCNC: 31 MMOL/L (ref 21–32)
CREAT BLD-MCNC: 0.86 MG/DL
DEPRECATED RDW RBC AUTO: 42.5 FL (ref 35.1–46.3)
EOSINOPHIL # BLD AUTO: 0.14 X10(3) UL (ref 0–0.7)
EOSINOPHIL NFR BLD AUTO: 3.3 %
ERYTHROCYTE [DISTWIDTH] IN BLOOD BY AUTOMATED COUNT: 12.2 % (ref 11–15)
FASTING PATIENT LIPID ANSWER: YES
FASTING STATUS PATIENT QL REPORTED: YES
GLOBULIN PLAS-MCNC: 3.6 G/DL (ref 2.8–4.4)
GLUCOSE BLD-MCNC: 92 MG/DL (ref 70–99)
HCT VFR BLD AUTO: 40.2 %
HDLC SERPL-MCNC: 51 MG/DL (ref 40–59)
HGB BLD-MCNC: 12.9 G/DL
IMM GRANULOCYTES # BLD AUTO: 0.01 X10(3) UL (ref 0–1)
IMM GRANULOCYTES NFR BLD: 0.2 %
LDLC SERPL CALC-MCNC: 132 MG/DL (ref ?–100)
LYMPHOCYTES # BLD AUTO: 1.77 X10(3) UL (ref 1–4)
LYMPHOCYTES NFR BLD AUTO: 41.2 %
MCH RBC QN AUTO: 30.4 PG (ref 26–34)
MCHC RBC AUTO-ENTMCNC: 32.1 G/DL (ref 31–37)
MCV RBC AUTO: 94.6 FL
MONOCYTES # BLD AUTO: 0.41 X10(3) UL (ref 0.1–1)
MONOCYTES NFR BLD AUTO: 9.5 %
NEUTROPHILS # BLD AUTO: 1.92 X10 (3) UL (ref 1.5–7.7)
NEUTROPHILS # BLD AUTO: 1.92 X10(3) UL (ref 1.5–7.7)
NEUTROPHILS NFR BLD AUTO: 44.6 %
NONHDLC SERPL-MCNC: 152 MG/DL (ref ?–130)
OSMOLALITY SERPL CALC.SUM OF ELEC: 292 MOSM/KG (ref 275–295)
PLATELET # BLD AUTO: 233 10(3)UL (ref 150–450)
POTASSIUM SERPL-SCNC: 4.5 MMOL/L (ref 3.5–5.1)
PROT SERPL-MCNC: 7.3 G/DL (ref 6.4–8.2)
RBC # BLD AUTO: 4.25 X10(6)UL
SODIUM SERPL-SCNC: 141 MMOL/L (ref 136–145)
T4 FREE SERPL-MCNC: 1 NG/DL (ref 0.8–1.7)
TRIGL SERPL-MCNC: 112 MG/DL (ref 30–149)
TSI SER-ACNC: 1.63 MIU/ML (ref 0.36–3.74)
VLDLC SERPL CALC-MCNC: 20 MG/DL (ref 0–30)
WBC # BLD AUTO: 4.3 X10(3) UL (ref 4–11)

## 2022-05-23 PROCEDURE — 80061 LIPID PANEL: CPT

## 2022-05-23 PROCEDURE — 80053 COMPREHEN METABOLIC PANEL: CPT | Performed by: INTERNAL MEDICINE

## 2022-05-23 PROCEDURE — 85025 COMPLETE CBC W/AUTO DIFF WBC: CPT | Performed by: INTERNAL MEDICINE

## 2022-05-23 PROCEDURE — 36415 COLL VENOUS BLD VENIPUNCTURE: CPT | Performed by: INTERNAL MEDICINE

## 2022-05-23 PROCEDURE — 84439 ASSAY OF FREE THYROXINE: CPT

## 2022-05-23 PROCEDURE — 84443 ASSAY THYROID STIM HORMONE: CPT

## 2023-03-17 RX ORDER — ATORVASTATIN CALCIUM 10 MG/1
TABLET, FILM COATED ORAL
Qty: 90 TABLET | Refills: 4 | Status: SHIPPED | OUTPATIENT
Start: 2023-03-17

## 2023-05-26 ENCOUNTER — OFFICE VISIT (OUTPATIENT)
Dept: INTERNAL MEDICINE CLINIC | Facility: CLINIC | Age: 83
End: 2023-05-26
Payer: MEDICARE

## 2023-05-26 VITALS
BODY MASS INDEX: 30.9 KG/M2 | DIASTOLIC BLOOD PRESSURE: 60 MMHG | SYSTOLIC BLOOD PRESSURE: 120 MMHG | HEIGHT: 64 IN | WEIGHT: 181 LBS

## 2023-05-26 DIAGNOSIS — I10 ESSENTIAL HYPERTENSION: ICD-10-CM

## 2023-05-26 DIAGNOSIS — Z85.828 HX OF SKIN CANCER, BASAL CELL: ICD-10-CM

## 2023-05-26 DIAGNOSIS — Z00.00 MEDICARE ANNUAL WELLNESS VISIT, SUBSEQUENT: Primary | ICD-10-CM

## 2023-05-26 RX ORDER — LISINOPRIL 10 MG/1
TABLET ORAL
Qty: 90 TABLET | Refills: 3 | Status: SHIPPED | OUTPATIENT
Start: 2023-05-26

## 2023-05-27 LAB
ABSOLUTE BASOPHILS: 52 CELLS/UL (ref 0–200)
ABSOLUTE EOSINOPHILS: 161 CELLS/UL (ref 15–500)
ABSOLUTE LYMPHOCYTES: 1804 CELLS/UL (ref 850–3900)
ABSOLUTE MONOCYTES: 458 CELLS/UL (ref 200–950)
ABSOLUTE NEUTROPHILS: 2725 CELLS/UL (ref 1500–7800)
ALBUMIN/GLOBULIN RATIO: 1.5 (CALC) (ref 1–2.5)
ALBUMIN: 4 G/DL (ref 3.6–5.1)
ALKALINE PHOSPHATASE: 56 U/L (ref 37–153)
ALT: 11 U/L (ref 6–29)
AST: 13 U/L (ref 10–35)
BASOPHILS: 1 %
BILIRUBIN, TOTAL: 1.3 MG/DL (ref 0.2–1.2)
BUN: 12 MG/DL (ref 7–25)
CALCIUM: 9.4 MG/DL (ref 8.6–10.4)
CARBON DIOXIDE: 27 MMOL/L (ref 20–32)
CHLORIDE: 106 MMOL/L (ref 98–110)
CHOL/HDLC RATIO: 4 (CALC)
CHOLESTEROL, TOTAL: 190 MG/DL
CREATININE: 0.61 MG/DL (ref 0.6–0.95)
EGFR: 89 ML/MIN/1.73M2
EOSINOPHILS: 3.1 %
GLOBULIN: 2.7 G/DL (CALC) (ref 1.9–3.7)
GLUCOSE: 83 MG/DL (ref 65–99)
HDL CHOLESTEROL: 48 MG/DL
HEMATOCRIT: 36.2 % (ref 35–45)
HEMOGLOBIN: 12.1 G/DL (ref 11.7–15.5)
LDL-CHOLESTEROL: 122 MG/DL (CALC)
LYMPHOCYTES: 34.7 %
MCH: 30.9 PG (ref 27–33)
MCHC: 33.4 G/DL (ref 32–36)
MCV: 92.3 FL (ref 80–100)
MONOCYTES: 8.8 %
MPV: 10.1 FL (ref 7.5–12.5)
NEUTROPHILS: 52.4 %
NON-HDL CHOLESTEROL: 142 MG/DL (CALC)
PLATELET COUNT: 200 THOUSAND/UL (ref 140–400)
POTASSIUM: 4.5 MMOL/L (ref 3.5–5.3)
PROTEIN, TOTAL: 6.7 G/DL (ref 6.1–8.1)
RDW: 11.9 % (ref 11–15)
RED BLOOD CELL COUNT: 3.92 MILLION/UL (ref 3.8–5.1)
SODIUM: 141 MMOL/L (ref 135–146)
TRIGLYCERIDES: 94 MG/DL
TSH W/REFLEX TO FT4: 1.82 MIU/L (ref 0.4–4.5)
WHITE BLOOD CELL COUNT: 5.2 THOUSAND/UL (ref 3.8–10.8)

## 2023-06-15 ENCOUNTER — OFFICE VISIT (OUTPATIENT)
Dept: DERMATOLOGY CLINIC | Facility: CLINIC | Age: 83
End: 2023-06-15
Payer: MEDICARE

## 2023-06-15 DIAGNOSIS — L57.0 MULTIPLE ACTINIC KERATOSES: Primary | ICD-10-CM

## 2023-06-15 DIAGNOSIS — D49.2 NEOPLASM OF UNSPECIFIED BEHAVIOR OF BONE, SOFT TISSUE, AND SKIN: ICD-10-CM

## 2023-06-15 PROCEDURE — 17003 DESTRUCT PREMALG LES 2-14: CPT | Performed by: STUDENT IN AN ORGANIZED HEALTH CARE EDUCATION/TRAINING PROGRAM

## 2023-06-15 PROCEDURE — 17000 DESTRUCT PREMALG LESION: CPT | Performed by: STUDENT IN AN ORGANIZED HEALTH CARE EDUCATION/TRAINING PROGRAM

## 2023-06-15 PROCEDURE — 11102 TANGNTL BX SKIN SINGLE LES: CPT | Performed by: STUDENT IN AN ORGANIZED HEALTH CARE EDUCATION/TRAINING PROGRAM

## 2023-06-19 ENCOUNTER — TELEPHONE (OUTPATIENT)
Dept: DERMATOLOGY CLINIC | Facility: CLINIC | Age: 83
End: 2023-06-19

## 2023-06-19 NOTE — TELEPHONE ENCOUNTER
Called patient's daughter with results. Have an appointment with Kayce Polanco for July 7th. Please send records to [facility]. Thank you.

## 2023-07-10 ENCOUNTER — MED REC SCAN ONLY (OUTPATIENT)
Dept: DERMATOLOGY CLINIC | Facility: CLINIC | Age: 83
End: 2023-07-10

## 2023-12-06 ENCOUNTER — OFFICE VISIT (OUTPATIENT)
Dept: DERMATOLOGY CLINIC | Facility: CLINIC | Age: 83
End: 2023-12-06
Payer: MEDICARE

## 2023-12-06 DIAGNOSIS — Z85.828 HISTORY OF NONMELANOMA SKIN CANCER: ICD-10-CM

## 2023-12-06 DIAGNOSIS — L82.1 SEBORRHEIC KERATOSES: Primary | ICD-10-CM

## 2023-12-06 DIAGNOSIS — L81.4 LENTIGINES: ICD-10-CM

## 2023-12-06 DIAGNOSIS — D22.9 MULTIPLE BENIGN NEVI: ICD-10-CM

## 2023-12-06 PROCEDURE — 99213 OFFICE O/P EST LOW 20 MIN: CPT | Performed by: STUDENT IN AN ORGANIZED HEALTH CARE EDUCATION/TRAINING PROGRAM

## 2023-12-06 PROCEDURE — 1159F MED LIST DOCD IN RCRD: CPT | Performed by: STUDENT IN AN ORGANIZED HEALTH CARE EDUCATION/TRAINING PROGRAM

## 2023-12-06 PROCEDURE — 1160F RVW MEDS BY RX/DR IN RCRD: CPT | Performed by: STUDENT IN AN ORGANIZED HEALTH CARE EDUCATION/TRAINING PROGRAM

## 2023-12-06 NOTE — PROGRESS NOTES
December 6, 2023    Established patient     CHIEF COMPLAINT: UBSE    HISTORY OF PRESENT ILLNESS: .    - No particular lesions of concern. Pt had a MOHS procedure to the nodular BCC on the L cheek with Dr Eladia Arevalo in July. Area is healing well. DERM HISTORY:  History of skin cancer: Yes    FAMILY HISTORY:  History of melanoma: No    PAST MEDICAL HISTORY:  Past Medical History:   Diagnosis Date    Essential hypertension     Hyperlipidemia        REVIEW OF SYSTEMS:  Constitutional: Denies fever, chills, unintentional weight loss. Skin as per HPI    Medications  Current Outpatient Medications   Medication Sig Dispense Refill    LISINOPRIL 10 MG Oral Tab TAKE 1 TABLET BY MOUTH EVERY DAY 90 tablet 3    ATORVASTATIN 10 MG Oral Tab TAKE 1 TABLET BY MOUTH EVERY DAY 90 tablet 4    amLODIPine 2.5 MG Oral Tab Take 1 tablet (2.5 mg total) by mouth daily. 30 tablet 11    Meloxicam 15 MG Oral Tab Take 1 tablet (15 mg total) by mouth daily. 30 tablet 5    aspirin 81 MG Oral Tab Take 1 tablet (81 mg total) by mouth daily. Clobetasol Prop Emollient Base (CLOBETASOL PROPIONATE E) 0.05 % External Cream APPLY EXTERNALLY TO THE AFFECTED AREA TWICE DAILY (Patient not taking: Reported on 6/15/2023) 30 g 03    Ketotifen Fumarate 0.025 % Ophthalmic Solution Place 1 drop into both eyes 2 (two) times daily.  (Patient not taking: Reported on 6/15/2023) 1 Bottle 0    Clobetasol Propionate (CLOBEX) 0.05 % External Lotion Topically bid (Patient not taking: Reported on 6/15/2023) 1 Bottle 1       PHYSICAL EXAM:  Patient declined chaperone   General: awake, alert, no acute distress  Skin: Skin exam was performed today including the following: head and face, scalp, neck, chest (including breasts and axillae), abdomen:   - Scattered bright red-purple dome-shaped papules on the trunk and extremities   - Scattered light brown stellate macules on sun exposed sites  - Scattered, evenly colored, round brown macules and papules with regular borders on the trunk and extremities  - Numerous scattered skin-colored and brown, waxy, stuck-on papules and plaques on the trunk and extremities      ASSESSMENT & PLAN:  Pathophysiology of diagnoses discussed with patient. Therapeutic options reviewed. Risks, benefits, and alternatives discussed with patient. Instructions reviewed at length. #Lentigines  #Seborrheic keratoses   #Cherry angiomas   - Reassurance provided regarding the benign nature of these lesions. #Multiple benign nevi  - Complete skin exam performed today with no outlier lesions identified   - Reassured patient of benign nature of these lesions.   - Recommend daily photoprotection with broad-spectrum sunscreen, avoidance of sun during peak hours, and sun protective clothing.    - Dermoscopy was used for physical examination of pigmented lesions during today's office visit. #History of nonmelanoma skin cancer - s/p MOHS  - No evidence of recurrence on exam. Continued monitoring. Regular skin exams discussed given increased risk of subsequent cutaneous malignancies. - Should any areas change in size, shape or color, bleed or become tender, the patient will contact the office for evaluation sooner than their interval appointment.     Return to clinic: 6 months or sooner if something concerning arises     Hasmukh Carlos MD

## 2024-05-02 ENCOUNTER — OFFICE VISIT (OUTPATIENT)
Dept: INTERNAL MEDICINE CLINIC | Facility: CLINIC | Age: 84
End: 2024-05-02
Payer: MEDICARE

## 2024-05-02 VITALS
WEIGHT: 177 LBS | SYSTOLIC BLOOD PRESSURE: 120 MMHG | HEIGHT: 64 IN | BODY MASS INDEX: 30.22 KG/M2 | OXYGEN SATURATION: 96 % | HEART RATE: 72 BPM | DIASTOLIC BLOOD PRESSURE: 60 MMHG

## 2024-05-02 DIAGNOSIS — Z00.00 MEDICARE ANNUAL WELLNESS VISIT, SUBSEQUENT: Primary | ICD-10-CM

## 2024-05-02 DIAGNOSIS — Z85.828 HX OF SKIN CANCER, BASAL CELL: ICD-10-CM

## 2024-05-02 DIAGNOSIS — R51.9 HEADACHE AROUND THE EYES: Primary | ICD-10-CM

## 2024-05-02 DIAGNOSIS — L30.9 ECZEMA, UNSPECIFIED TYPE: ICD-10-CM

## 2024-05-02 DIAGNOSIS — I10 ESSENTIAL HYPERTENSION: ICD-10-CM

## 2024-05-02 DIAGNOSIS — E78.2 MIXED HYPERLIPIDEMIA: ICD-10-CM

## 2024-05-02 PROCEDURE — 96160 PT-FOCUSED HLTH RISK ASSMT: CPT | Performed by: INTERNAL MEDICINE

## 2024-05-02 PROCEDURE — G0439 PPPS, SUBSEQ VISIT: HCPCS | Performed by: INTERNAL MEDICINE

## 2024-05-02 RX ORDER — CLOBETASOL PROPIONATE 0.5 MG/G
CREAM TOPICAL
Qty: 30 G | Refills: 3 | Status: SHIPPED | OUTPATIENT
Start: 2024-05-02

## 2024-05-02 RX ORDER — LISINOPRIL 10 MG/1
10 TABLET ORAL DAILY
Qty: 90 TABLET | Refills: 3 | Status: SHIPPED | OUTPATIENT
Start: 2024-05-02

## 2024-05-02 NOTE — PROGRESS NOTES
Subjective:   Abbey Rodriguez is a 83 year old female who presents for Physical     Patient here for a MA supervisit and fu on htn and hyperlipidemia. Has not taken medication today.  Has occasional humming sensation in the head.  HPI:   Abbey Rodriguez is a 83 year old female who presents for a MA Supervisit.    Patient Active Problem List   Diagnosis    Eczema    Arthritis       General Health     In the past six months, have you lost more than 10 pounds without trying?: 2 - No    Has your appetite been poor?: No    Type of Diet: Balanced    How does the patient maintain a good energy level?: Appropriate Exercise    How would you describe your daily physical activity?: Light    How would you describe your current health state?: Fair    How do you maintain positive mental well-being?: Social Interaction;Visiting Family         Have you had any immunizations at another office such as Influenza, Hepatitis B, Tetanus, or Pneumococcal?: Yes     Functional Ability     Bathing or Showering: Able without help    Toileting: Able without help    Dressing: Able without help    Eating: Able without help    Driving: Able without help    Preparing your meals: Able without help    Managing money/bills: Able without help    Taking medications as prescribed: Able without help    Are you able to afford your medications?: Yes    Hearing Problems?: No     Functional Status     Hearing Problems?: No    Vision Problems? : No    Difficulty walking?: No    Difficulty dressing or bathing?: No    Problems with daily activities? : No    Memory Problems?: No      Fall/Risk Assessment                                                              Depression Screening (PHQ-2/PHQ-9): Over the LAST 2 WEEKS                      Advance Directives     Do you have a healthcare power of ?: No    Do you have a living will?: No   Was Medicare Assessment Questionnaire completed by patient and sent to HIM for scanning?Yes    Please go to \"Cognitive  Assessment\" under Medicare Assessment section in Charting, test patient and document.    Then, refresh your progress note to see your input here.  Cognitive Assessment     What day of the week is this?: Incorrect    What month is it?: Incorrect    What year is it?: Incorrect    Recall \"Ball\": Incorrect    Recall \"Flag\": Incorrect    Recall \"Tree\": Incorrect      ALLERGIES:   No Known Allergies    CURRENT MEDICATIONS:   Current Outpatient Medications   Medication Sig Dispense Refill    lisinopril 10 MG Oral Tab Take 1 tablet (10 mg total) by mouth daily. 90 tablet 3    Clobetasol Prop Emollient Base (CLOBETASOL PROPIONATE E) 0.05 % External Cream APPLY EXTERNALLY TO THE AFFECTED AREA TWICE DAILY 30 g 03    ATORVASTATIN 10 MG Oral Tab TAKE 1 TABLET BY MOUTH EVERY DAY 90 tablet 4    Meloxicam 15 MG Oral Tab Take 1 tablet (15 mg total) by mouth daily. 30 tablet 5    Ketotifen Fumarate 0.025 % Ophthalmic Solution Place 1 drop into both eyes 2 (two) times daily. (Patient not taking: Reported on 6/15/2023) 1 Bottle 0    Clobetasol Propionate (CLOBEX) 0.05 % External Lotion Topically bid (Patient not taking: Reported on 6/15/2023) 1 Bottle 1    aspirin 81 MG Oral Tab Take 1 tablet (81 mg total) by mouth daily.        MEDICAL INFORMATION:   Past Medical History:    Essential hypertension    Hyperlipidemia      History reviewed. No pertinent surgical history.   History reviewed. No pertinent family history.   SOCIAL HISTORY:   Social History     Socioeconomic History    Marital status:    Tobacco Use    Smoking status: Never    Smokeless tobacco: Never   Substance and Sexual Activity    Alcohol use: Yes     Alcohol/week: 0.0 standard drinks of alcohol     Comment: Social    Drug use: No   Other Topics Concern    Caffeine Concern Yes     Comment: 2 cups of coffee daily    Exercise Yes     Comment: walks daily    Reaction to local anesthetic No    Pt has a pacemaker No    Pt has a defibrillator No     Occ: retired  :       REVIEW OF SYSTEMS:   GENERAL: feels well otherwise  SKIN: denies any unusual skin lesions eczema on the shins  EYES: denies blurred vision or double vision bifocal glasses  HEENT: denies nasal congestion, sinus pain or ST  LUNGS: denies shortness of breath with exertion  CARDIOVASCULAR: denies chest pain on exertion  GI: denies abdominal pain, denies heartburn  : denies dysuria, vaginal discharge or itching, no complaint of urinary incontinence   MUSCULOSKELETAL: denies back pain  NEURO: denies headaches humming in the head  PSYCHE: denies depression or anxiety  HEMATOLOGIC: denies hx of anemia  ENDOCRINE: denies thyroid history  ALL/ASTHMA: denies hx of allergy or asthma    EXAM:   /60   Pulse 72   Ht 5' 4\" (1.626 m)   Wt 177 lb (80.3 kg)   SpO2 96%   BMI 30.38 kg/m²      >   Wt Readings from Last 6 Encounters:   05/02/24 177 lb (80.3 kg)   05/26/23 181 lb (82.1 kg)   05/19/22 180 lb (81.6 kg)   05/21/21 175 lb 12.8 oz (79.7 kg)   03/26/20 175 lb (79.4 kg)   11/15/19 170 lb (77.1 kg)       >   BP Readings from Last 3 Encounters:   05/02/24 120/60   05/26/23 120/60   05/19/22 122/70       GENERAL: well developed, well nourished, in no apparent distress  SKIN: no rashes, no suspicious lesions eczema on the left shin area  HEENT: atraumatic, normocephalic, ears and throat are clear   Hearing intact  EYES: PERRLA, EOMI, conjunctiva are clear  Right Eye Visual Acuity: Corrected Left Eye Visual Acuity: Corrected   Right Eye Chart Acuity: 20/20 Left Eye Chart Acuity: 20/20   NECK: supple, no adenopathy, no bruits no JVD  CHEST: no chest tenderness  BREAST: no masses, no discharge or no axillary lymphadenopathy atrophic architecture  LUNGS: clear to auscultation  CARDIO: RRR without murmur  GI: good BS's, no masses, HSM or tenderness  : deferred  RECTAL: deferred  MUSCULOSKELETAL: back is not tender, FROM of the back  EXTREMITIES: no cyanosis, clubbing or edema.  NEURO: Oriented times  three, cranial nerves are intact, motor and sensory are grossly intact    ASSESSMENT AND OTHER RELEVANT CHRONIC CONDITIONS:   Abbey Rodriguez is a 83 year old female who presents for a Medicare Assessment.     PLAN SUMMARY:   Diagnoses and all orders for this visit:    Medicare annual wellness visit, subsequent  -     CBC [6399] [Q]  -     TSH W REFLEX TO FREE T4 [40339][Q]    Essential hypertension    Eczema, unspecified type    Hx of skin cancer, basal cell    Mixed hyperlipidemia  -     Comp Metabolic Panel (14) [E]  -     LIPID PANEL [7600] [Q]    Other orders  -     lisinopril 10 MG Oral Tab; Take 1 tablet (10 mg total) by mouth daily.  -     Clobetasol Prop Emollient Base (CLOBETASOL PROPIONATE E) 0.05 % External Cream; APPLY EXTERNALLY TO THE AFFECTED AREA TWICE DAILY         The patient indicates understanding of these issues and agrees to the plan.  The patient is asked to return in 12m for fu.       PREVENTATIVE SERVICES  INDICATIONS AND SCHEDULE Internal Lab or Procedure External Lab or Procedure   Diabetes Screening      HbgA1C   Annually No results found for: \"A1C\"      No data to display                Fasting Blood Sugar (FSB)Annually No results found for: \"GLUCOSE\"    Cardiovascular Disease Screening     LDL Annually LDL-CHOLESTEROL (mg/dL (calc))   Date Value   05/26/2023 122 (H)        EKG One Time     Colorectal Cancer Screening      Colonoscopy Screen every 10 years No recommendations at this time Update Health Maintenance if applicable    Flex Sigmoidoscopy Screen every 10 years No results found for this or any previous visit.      No data to display                 Fecal Occult Blood Annually No results found for: \"FOB\"      No data to display                Glaucoma Screening      Ophthalmology Visit Annually     Bone Density Screening      Dexascan Every two years Last Dexa Scan:    XR DEXA BONE DENSITOMETRY (CPT=77080) 11/14/2014        No data to display                Pap and Pelvic      Pap   Annually if high risk No recommendations at this time Update Health Maintenance if applicable    Pap  Every two years No recommendations at this time Update Health Maintenance if applicable    Chlamydia  Annually if high risk No results found for: \"CHLAMYDIA\"      No data to display                Screening Mammogram      Mammogram  Annually No recommendations at this time Update Health Maintenance if applicable   Immunizations      Influenza No orders found for this or any previous visit. Update Immunization Activity if applicable    Pneumococcal No orders found for this or any previous visit. Update Immunization Activity if applicable    Hepatitis B No orders found for this or any previous visit. Update Immunization Activity if applicable    Tetanus No orders found for this or any previous visit. Update Immunization Activity if applicable        SPECIFIC DISEASE MONITORING Internal Lab or Procedure External Lab or Procedure   Annual Monitoring of Persistent     Medications (ACE/ARB, digoxin diuretics, anticonvulsants.)    Potassium  Annually POTASSIUM (mmol/L)   Date Value   05/26/2023 4.5         No data to display                Creatinine  Annually CREATININE (mg/dL)   Date Value   05/26/2023 0.61         No data to display                BUN  Annually UREA NITROGEN (BUN) (mg/dL)   Date Value   05/26/2023 12         No data to display                 Drug Serum Conc  Annually No results found for: \"DIGOXIN\", \"DIG\", \"VALP\"      No data to display                Diabetes      HgbA1C  Annually No results found for: \"A1C\"      No data to display                Creat/alb ratio  Annually      LDL  Annually LDL-CHOLESTEROL (mg/dL (calc))   Date Value   05/26/2023 122 (H)         No data to display                 Dilated Eye exam  Annually      No data to display                   No data to display                COPD      Spirometry Testing Annually No results found for this or any previous visit.      No data to  display                  SCREENING SCHEDULE - FEMALE      SCREEN COVERAGE SCHEDULE  (If Indicated) LAST DONE   Dexa If at Risk q2 years    Lipids All Patients q5 years LDL-CHOLESTEROL (mg/dL (calc))   Date Value   05/26/2023 122 (H)      Colonoscopy All Patients q10 years No recommendations at this time   FBS All Patients q1 year No results found for: \"GLUCOSE\"   Glaucoma If at high risk q1 year    Pap If at high risk q1 year No recommendations at this time   Pap All Patients q2 year if indicated No recommendations at this time   Mammogram Age > 39 q1 year No recommendations at this time   EKG All Patients One at initial exam    Vaccines:      Pneumococcal All Patients Once per lifetime No orders found for this or any previous visit.   Influenza All Patients Annually No orders found for this or any previous visit.   Hepatitis B If at Risk 3 scheduled doses No orders found for this or any previous visit.         SUGGESTED VACCINATIONS - Influenza, Pneumococcal, Zoster, Tetanus     Immunization History   Administered Date(s) Administered    FLUAD High Dose 65 yr and older (91723) 10/19/2018         History/Other:    Chief Complaint Reviewed and Verified  No Further Nursing Notes to   Review  Tobacco Reviewed  Allergies Reviewed  Medications Reviewed    Problem List Reviewed  Medical History Reviewed  Surgical History   Reviewed  Family History Reviewed         Tobacco:  She has never smoked tobacco.    Current Outpatient Medications   Medication Sig Dispense Refill    lisinopril 10 MG Oral Tab Take 1 tablet (10 mg total) by mouth daily. 90 tablet 3    Clobetasol Prop Emollient Base (CLOBETASOL PROPIONATE E) 0.05 % External Cream APPLY EXTERNALLY TO THE AFFECTED AREA TWICE DAILY 30 g 03    ATORVASTATIN 10 MG Oral Tab TAKE 1 TABLET BY MOUTH EVERY DAY 90 tablet 4    Meloxicam 15 MG Oral Tab Take 1 tablet (15 mg total) by mouth daily. 30 tablet 5    Ketotifen Fumarate 0.025 % Ophthalmic Solution Place 1 drop into  both eyes 2 (two) times daily. (Patient not taking: Reported on 6/15/2023) 1 Bottle 0    Clobetasol Propionate (CLOBEX) 0.05 % External Lotion Topically bid (Patient not taking: Reported on 6/15/2023) 1 Bottle 1    aspirin 81 MG Oral Tab Take 1 tablet (81 mg total) by mouth daily.           Review of Systems:  Review of Systems      Objective:   /60   Pulse 72   Ht 5' 4\" (1.626 m)   Wt 177 lb (80.3 kg)   SpO2 96%   BMI 30.38 kg/m²  Estimated body mass index is 30.38 kg/m² as calculated from the following:    Height as of this encounter: 5' 4\" (1.626 m).    Weight as of this encounter: 177 lb (80.3 kg).  Physical Exam      Assessment & Plan:   1. Medicare annual wellness visit, subsequent (Primary)  check fasting labs  2. Essential hypertension Lisinopril 10 mg   3. Eczema, unspecified type triamcinolone  4. Hx of skin cancer, basal cell - surveillance        No follow-ups on file.    Amalia Shipman MD, 5/2/2024, 9:39 AM

## 2024-05-03 LAB
ABSOLUTE BASOPHILS: 50 CELLS/UL (ref 0–200)
ABSOLUTE EOSINOPHILS: 120 CELLS/UL (ref 15–500)
ABSOLUTE LYMPHOCYTES: 1980 CELLS/UL (ref 850–3900)
ABSOLUTE MONOCYTES: 410 CELLS/UL (ref 200–950)
ABSOLUTE NEUTROPHILS: 2440 CELLS/UL (ref 1500–7800)
ALBUMIN/GLOBULIN RATIO: 1.8 (CALC) (ref 1–2.5)
ALBUMIN: 4.4 G/DL (ref 3.6–5.1)
ALKALINE PHOSPHATASE: 55 U/L (ref 37–153)
ALT: 11 U/L (ref 6–29)
AST: 15 U/L (ref 10–35)
BASOPHILS: 1 %
BILIRUBIN, TOTAL: 1.2 MG/DL (ref 0.2–1.2)
BUN: 12 MG/DL (ref 7–25)
CALCIUM: 9.9 MG/DL (ref 8.6–10.4)
CARBON DIOXIDE: 28 MMOL/L (ref 20–32)
CHLORIDE: 104 MMOL/L (ref 98–110)
CHOL/HDLC RATIO: 4.7 (CALC)
CHOLESTEROL, TOTAL: 240 MG/DL
CREATININE: 0.76 MG/DL (ref 0.6–0.95)
EGFR: 78 ML/MIN/1.73M2
EOSINOPHILS: 2.4 %
GLOBULIN: 2.5 G/DL (CALC) (ref 1.9–3.7)
GLUCOSE: 83 MG/DL (ref 65–99)
HDL CHOLESTEROL: 51 MG/DL
HEMATOCRIT: 38.4 % (ref 35–45)
HEMOGLOBIN: 12.6 G/DL (ref 11.7–15.5)
LDL-CHOLESTEROL: 163 MG/DL (CALC)
LYMPHOCYTES: 39.6 %
MCH: 30.4 PG (ref 27–33)
MCHC: 32.8 G/DL (ref 32–36)
MCV: 92.5 FL (ref 80–100)
MONOCYTES: 8.2 %
MPV: 9.8 FL (ref 7.5–12.5)
NEUTROPHILS: 48.8 %
NON-HDL CHOLESTEROL: 189 MG/DL (CALC)
PLATELET COUNT: 250 THOUSAND/UL (ref 140–400)
POTASSIUM: 4.7 MMOL/L (ref 3.5–5.3)
PROTEIN, TOTAL: 6.9 G/DL (ref 6.1–8.1)
RDW: 11.9 % (ref 11–15)
RED BLOOD CELL COUNT: 4.15 MILLION/UL (ref 3.8–5.1)
SODIUM: 141 MMOL/L (ref 135–146)
TRIGLYCERIDES: 131 MG/DL
TSH W/REFLEX TO FT4: 1.34 MIU/L (ref 0.4–4.5)
WHITE BLOOD CELL COUNT: 5 THOUSAND/UL (ref 3.8–10.8)

## 2024-05-23 ENCOUNTER — HOSPITAL ENCOUNTER (OUTPATIENT)
Dept: CT IMAGING | Facility: HOSPITAL | Age: 84
Discharge: HOME | End: 2024-05-23
Attending: INTERNAL MEDICINE

## 2024-05-23 DIAGNOSIS — R51.9 HEADACHE AROUND THE EYES: ICD-10-CM

## 2024-05-23 PROCEDURE — 70450 CT HEAD/BRAIN W/O DYE: CPT | Performed by: INTERNAL MEDICINE

## 2024-06-28 ENCOUNTER — OFFICE VISIT (OUTPATIENT)
Dept: FAMILY MEDICINE CLINIC | Facility: CLINIC | Age: 84
End: 2024-06-28

## 2024-06-28 VITALS
RESPIRATION RATE: 16 BRPM | HEART RATE: 71 BPM | DIASTOLIC BLOOD PRESSURE: 64 MMHG | HEIGHT: 64 IN | WEIGHT: 178 LBS | TEMPERATURE: 98 F | SYSTOLIC BLOOD PRESSURE: 126 MMHG | BODY MASS INDEX: 30.39 KG/M2 | OXYGEN SATURATION: 98 %

## 2024-06-28 DIAGNOSIS — J39.2 THROAT IRRITATION: ICD-10-CM

## 2024-06-28 DIAGNOSIS — R05.9 COUGH, UNSPECIFIED TYPE: Primary | ICD-10-CM

## 2024-06-28 LAB
OPERATOR ID: NORMAL
RAPID SARS-COV-2 BY PCR: NOT DETECTED

## 2024-06-28 PROCEDURE — 99213 OFFICE O/P EST LOW 20 MIN: CPT | Performed by: NURSE PRACTITIONER

## 2024-06-28 PROCEDURE — U0002 COVID-19 LAB TEST NON-CDC: HCPCS | Performed by: NURSE PRACTITIONER

## 2024-06-28 NOTE — PATIENT INSTRUCTIONS
Follow up with ENT, or to ER if symptosm worsening    Dr. Yasir Jensen.     [facility]    [address]  [place] [address]    [phone]      [facility]    [address]  [place] [address]    [phone]

## 2024-06-28 NOTE — PROGRESS NOTES
CHIEF COMPLAINT:     Chief Complaint   Patient presents with    Cough     Sx 6 days - Dry cough, chest congestion, scratchy throat  Denies fever, nasal congestion, headaches, SOB, chest pain, ear pain/pressure, loss of appetite  No Covid test was done at home  No OTC       HPI:   Abbey Rodriguez is a 83 year old female who presents for upper respiratory symptoms for  6 days. Patient reports  dry cough, chest congestion, and scratchy throat . Symptoms have been stable since onset.  Treating symptoms with none.   Associated symptoms include see above.    No home covid testing.     Pt and daughter denies fever, headaches, SOB, chset pain, ear pressure, or loss of appetite.     No known sick contacts.     Daughter reports approx 1 week ago was eating fish and felt fish bone get stuck in throat. After this cough and threw up. Cough stemmed from this. Pt throat still feels irritated    Current Outpatient Medications   Medication Sig Dispense Refill    lisinopril 10 MG Oral Tab Take 1 tablet (10 mg total) by mouth daily. 90 tablet 3    Clobetasol Prop Emollient Base (CLOBETASOL PROPIONATE E) 0.05 % External Cream APPLY EXTERNALLY TO THE AFFECTED AREA TWICE DAILY 30 g 03    ATORVASTATIN 10 MG Oral Tab TAKE 1 TABLET BY MOUTH EVERY DAY 90 tablet 4    aspirin 81 MG Oral Tab Take 1 tablet (81 mg total) by mouth daily.        Past Medical History:    Essential hypertension    Hyperlipidemia      History reviewed. No pertinent surgical history.      Social History     Socioeconomic History    Marital status:    Tobacco Use    Smoking status: Never    Smokeless tobacco: Never   Vaping Use    Vaping status: Never Used   Substance and Sexual Activity    Alcohol use: Yes     Alcohol/week: 0.0 standard drinks of alcohol     Comment: Social    Drug use: No   Other Topics Concern    Caffeine Concern Yes     Comment: 2 cups of coffee daily    Exercise Yes     Comment: walks daily    Reaction to local anesthetic No    Pt has a  pacemaker No    Pt has a defibrillator No         REVIEW OF SYSTEMS:   GENERAL: good appetite  SKIN: no rashes or abnormal skin lesions  HEENT: See HPI  LUNGS: denies shortness of breath or wheezing, See HPI  CARDIOVASCULAR: denies chest pain or palpitations   GI: denies N/V/C or abdominal pain  NEURO: Denies headaches    EXAM:   /64   Pulse 71   Temp 98.4 °F (36.9 °C)   Resp 16   Ht 5' 4\" (1.626 m)   Wt 178 lb (80.7 kg)   SpO2 98%   BMI 30.55 kg/m²   GENERAL: well developed, well nourished,in no apparent distress, Pt did cough once during visit.   SKIN: no rashes,no suspicious lesions  HEAD: atraumatic, normocephalic.  No tenderness on palpation of  sinuses  EYES: conjunctiva clear, EOM intact  EARS: TM's gray, no bulging, no retraction,no fluid, bony landmarks visible  NOSE: Nostrils patent, no nasal discharge, nasal mucosa pink   THROAT: Oral mucosa pink, moist. Posterior pharynx is minimally erythematous. no exudates. Tonsils 1/4.  Non-hoarse voice.   NECK: Supple, non-tender  LUNGS: clear to auscultation bilaterally, no wheezes or rhonchi. Breathing is non labored.  CARDIO: RRR without murmur  EXTREMITIES: no cyanosis, clubbing or edema  LYMPH:  no lymphadenopathy.        ASSESSMENT AND PLAN:   Abbey Rodriguez is a 83 year old female who presents with upper respiratory symptoms that are consistent with    ASSESSMENT:   Encounter Diagnoses   Name Primary?    Cough, unspecified type Yes    Throat irritation        PLAN: Meds as below.  Comfort care as described in Patient Instructions    Meds & Refills for this Visit:  Requested Prescriptions      No prescriptions requested or ordered in this encounter     Pt here with daughter. Vitals stable. PT able to eat and drink without difficulty however does some some throat irritation. No hx of fevers, SOB, chest pain. PT stable.     Discussed options with patient and daughter. May go to ER for further workup now. May go to ENT, Dr. Yasir Jensen has  appointments this Monday, next week for further evaluation if symptoms persist. Daughter states they will what and see how she does.     Discussed case with Dr. Sharpe.     ENT information given to patient and daughter. Recommended further evaluation in the ER if symptoms worsen.     Teas with honey. Soothing liquids. Cough drops may be considered.     Discussed s/s of worsening infection/condition with Patient and importance of prompt medical re-evaluation including when to seek emergency care. Patient  voiced understanding    Increase fluids. Humidified air.     May consider OTC tylenol or ibuprofen as needed and directed on packaging for pain/fever    May consider OTC dextromethorphan as needed and directed on packaging as a cough suppressant     All questions and concerns addressed. Encouraged Patient  to call clinic with any questions or concerns. I explained to the patient that emergent conditions may arise and to go to the ER for new, worsening or any persistent conditions.    Patient Instructions     Follow up with ENT, so to ER if symptosm worsening    Dr. Yasir Jensen.     [facility]    [address]  [place], [place] [id number] [phone]      [facility]    [address]  [place] [address]    [phone]        The patient indicates understanding of these issues and agrees to the plan.  The patient is asked to return if sx's persist or worsen.

## 2024-12-05 ENCOUNTER — OFFICE VISIT (OUTPATIENT)
Dept: DERMATOLOGY CLINIC | Facility: CLINIC | Age: 84
End: 2024-12-05
Payer: MEDICARE

## 2024-12-05 DIAGNOSIS — D18.01 CHERRY ANGIOMA: ICD-10-CM

## 2024-12-05 DIAGNOSIS — L57.0 MULTIPLE ACTINIC KERATOSES: ICD-10-CM

## 2024-12-05 DIAGNOSIS — L82.1 SEBORRHEIC KERATOSES: Primary | ICD-10-CM

## 2024-12-05 DIAGNOSIS — L81.4 LENTIGINES: ICD-10-CM

## 2024-12-05 DIAGNOSIS — D22.9 MULTIPLE BENIGN NEVI: ICD-10-CM

## 2024-12-05 PROCEDURE — 1160F RVW MEDS BY RX/DR IN RCRD: CPT | Performed by: STUDENT IN AN ORGANIZED HEALTH CARE EDUCATION/TRAINING PROGRAM

## 2024-12-05 PROCEDURE — 1159F MED LIST DOCD IN RCRD: CPT | Performed by: STUDENT IN AN ORGANIZED HEALTH CARE EDUCATION/TRAINING PROGRAM

## 2024-12-05 PROCEDURE — 99214 OFFICE O/P EST MOD 30 MIN: CPT | Performed by: STUDENT IN AN ORGANIZED HEALTH CARE EDUCATION/TRAINING PROGRAM

## 2024-12-05 PROCEDURE — 17003 DESTRUCT PREMALG LES 2-14: CPT | Performed by: STUDENT IN AN ORGANIZED HEALTH CARE EDUCATION/TRAINING PROGRAM

## 2024-12-05 PROCEDURE — 17000 DESTRUCT PREMALG LESION: CPT | Performed by: STUDENT IN AN ORGANIZED HEALTH CARE EDUCATION/TRAINING PROGRAM

## 2024-12-05 NOTE — PROGRESS NOTES
December 5, 2024    Established patient     Referred by:   No referring provider defined for this encounter.     CHIEF COMPLAINT: UBSE    HISTORY OF PRESENT ILLNESS: .    Growth  Location: Under right eye   Duration: 1 months   Signs and symptoms: None. Eye area is itchy, irritated, red, and swollen in general but not related.   Current treatment: None  Past treatments: None     DERM HISTORY:  History of skin cancer: Yes (L cheek- BCC- 2023)     FAMILY HISTORY:  History of melanoma: No    PAST MEDICAL HISTORY:  Past Medical History:    Essential hypertension    Hyperlipidemia       REVIEW OF SYSTEMS:  Constitutional: Denies fever, chills, unintentional weight loss.   Skin as per [facility]    Medications  Current Outpatient Medications   Medication Sig Dispense Refill    lisinopril 10 MG Oral Tab Take 1 tablet (10 mg total) by mouth daily. 90 tablet 3    Clobetasol Prop Emollient Base (CLOBETASOL PROPIONATE E) 0.05 % External Cream APPLY EXTERNALLY TO THE AFFECTED AREA TWICE DAILY 30 g 03    ATORVASTATIN 10 MG Oral Tab TAKE 1 TABLET BY MOUTH EVERY DAY 90 tablet 4    aspirin 81 MG Oral Tab Take 1 tablet (81 mg total) by mouth daily.         PHYSICAL EXAM:  Patient declined chaperone   General: awake, alert, no acute distress  Skin: Skin exam was performed today including the following: head and face, scalp, neck, chest (including breasts and axillae), abdomen, back, bilateral upper extremities, bilateral lower extremities, hands, feet, digits, nails. Pertinent findings include:   - Scattered bright red-purple dome-shaped papules on the trunk and extremities   - Scattered light brown stellate macules on sun exposed sites  - Scattered, evenly colored, round brown macules and papules with regular borders on the trunk and extremities  - Numerous scattered skin-colored and brown, waxy, stuck-on papules and plaques on the trunk and extremities      ASSESSMENT & PLAN:  Pathophysiology of diagnoses discussed with patient.   Therapeutic options reviewed. Risks, benefits, and alternatives discussed with patient. Instructions reviewed at length.    #Lentigines  #Seborrheic keratoses   #Cherry angiomas   - Reassurance provided regarding the benign nature of these lesions.    #Multiple benign nevi  - Complete skin exam performed today with no outlier lesions identified   - Reassured patient of benign nature of these lesions.   - Recommend daily photoprotection with broad-spectrum sunscreen, avoidance of sun during peak hours, and sun protective clothing.    - Dermoscopy was used for physical examination of pigmented lesions during today's office visit.    #Multiple actinic keratoses  - Discussed premalignant etiology and possibility of transformation to SCC  - Recommended cryotherapy today   - Discussed side effects including redness, swelling, crusting, and discolortion after treatment, wound care with soap/water and vaseline   - Recommend sun protection with spf 30 or higher, sun protective clothing such as wide brimmed hats and long sleeves. Recommend avoiding midday sun (10 am- 3 pm).     - Procedure Note Cryosurgery of pre-malignant lesion(s)  Risks, benefits, alternatives, complications, and personnel required for cryosurgery reviewed with patient. Patient verbalizes understanding and wishes to proceed.   - Cryosurgery performed with Liquid Nitrogen via cryostat spray gun to Actinic Keratosis . 2 lesion(s) treated.   - Patient tolerated well and wound care discussed. Return if lesions fail to fully resolve.    Return to clinic: 6 months or sooner if something concerning arises     Arron Irwin MD

## 2024-12-17 ENCOUNTER — APPOINTMENT (OUTPATIENT)
Dept: GENERAL RADIOLOGY | Facility: HOSPITAL | Age: 84
End: 2024-12-17
Attending: STUDENT IN AN ORGANIZED HEALTH CARE EDUCATION/TRAINING PROGRAM
Payer: MEDICARE

## 2024-12-17 ENCOUNTER — HOSPITAL ENCOUNTER (EMERGENCY)
Facility: HOSPITAL | Age: 84
Discharge: HOME | End: 2024-12-17
Attending: STUDENT IN AN ORGANIZED HEALTH CARE EDUCATION/TRAINING PROGRAM
Payer: MEDICARE

## 2024-12-17 ENCOUNTER — HOSPITAL ENCOUNTER (OUTPATIENT)
Age: 84
Discharge: ANOTHER HOSPITAL | End: 2024-12-17
Payer: MEDICARE

## 2024-12-17 VITALS
SYSTOLIC BLOOD PRESSURE: 149 MMHG | DIASTOLIC BLOOD PRESSURE: 82 MMHG | TEMPERATURE: 97 F | RESPIRATION RATE: 20 BRPM | HEART RATE: 72 BPM | OXYGEN SATURATION: 95 %

## 2024-12-17 VITALS
TEMPERATURE: 98 F | HEART RATE: 70 BPM | OXYGEN SATURATION: 95 % | SYSTOLIC BLOOD PRESSURE: 140 MMHG | WEIGHT: 170 LBS | DIASTOLIC BLOOD PRESSURE: 79 MMHG | HEIGHT: 64 IN | RESPIRATION RATE: 12 BRPM | BODY MASS INDEX: 29.02 KG/M2

## 2024-12-17 DIAGNOSIS — R10.12 LEFT UPPER QUADRANT ABDOMINAL PAIN: Primary | ICD-10-CM

## 2024-12-17 DIAGNOSIS — R07.9 ACUTE CHEST PAIN: Primary | ICD-10-CM

## 2024-12-17 LAB
ANION GAP SERPL CALC-SCNC: 8 MMOL/L (ref 0–18)
ATRIAL RATE: 70 BPM
BASOPHILS # BLD AUTO: 0.04 X10(3) UL (ref 0–0.2)
BASOPHILS NFR BLD AUTO: 0.8 %
BUN BLD-MCNC: 13 MG/DL (ref 9–23)
BUN/CREAT SERPL: 17.1 (ref 10–20)
CALCIUM BLD-MCNC: 10.1 MG/DL (ref 8.7–10.4)
CHLORIDE SERPL-SCNC: 108 MMOL/L (ref 98–112)
CO2 SERPL-SCNC: 26 MMOL/L (ref 21–32)
CREAT BLD-MCNC: 0.76 MG/DL
DEPRECATED RDW RBC AUTO: 39.4 FL (ref 35.1–46.3)
EGFRCR SERPLBLD CKD-EPI 2021: 77 ML/MIN/1.73M2 (ref 60–?)
EOSINOPHIL # BLD AUTO: 0.11 X10(3) UL (ref 0–0.7)
EOSINOPHIL NFR BLD AUTO: 2.3 %
ERYTHROCYTE [DISTWIDTH] IN BLOOD BY AUTOMATED COUNT: 12 % (ref 11–15)
GLUCOSE BLD-MCNC: 99 MG/DL (ref 70–99)
HCT VFR BLD AUTO: 38 %
HGB BLD-MCNC: 12.9 G/DL
IMM GRANULOCYTES # BLD AUTO: 0.01 X10(3) UL (ref 0–1)
IMM GRANULOCYTES NFR BLD: 0.2 %
LYMPHOCYTES # BLD AUTO: 1.64 X10(3) UL (ref 1–4)
LYMPHOCYTES NFR BLD AUTO: 34.2 %
MCH RBC QN AUTO: 30.5 PG (ref 26–34)
MCHC RBC AUTO-ENTMCNC: 33.9 G/DL (ref 31–37)
MCV RBC AUTO: 89.8 FL
MONOCYTES # BLD AUTO: 0.34 X10(3) UL (ref 0.1–1)
MONOCYTES NFR BLD AUTO: 7.1 %
NEUTROPHILS # BLD AUTO: 2.66 X10 (3) UL (ref 1.5–7.7)
NEUTROPHILS # BLD AUTO: 2.66 X10(3) UL (ref 1.5–7.7)
NEUTROPHILS NFR BLD AUTO: 55.4 %
OSMOLALITY SERPL CALC.SUM OF ELEC: 294 MOSM/KG (ref 275–295)
P AXIS: 30 DEGREES
P-R INTERVAL: 184 MS
PLATELET # BLD AUTO: 229 10(3)UL (ref 150–450)
POTASSIUM SERPL-SCNC: 4.1 MMOL/L (ref 3.5–5.1)
Q-T INTERVAL: 414 MS
Q-T INTERVAL: 414 MS
QRS DURATION: 110 MS
QRS DURATION: 114 MS
QTC CALCULATION (BEZET): 437 MS
QTC CALCULATION (BEZET): 447 MS
R AXIS: -29 DEGREES
R AXIS: -38 DEGREES
RBC # BLD AUTO: 4.23 X10(6)UL
SODIUM SERPL-SCNC: 142 MMOL/L (ref 136–145)
T AXIS: -22 DEGREES
T AXIS: 46 DEGREES
TROPONIN I SERPL HS-MCNC: 13 NG/L
TROPONIN I SERPL HS-MCNC: 15 NG/L
VENTRICULAR RATE: 67 BPM
VENTRICULAR RATE: 70 BPM
WBC # BLD AUTO: 4.8 X10(3) UL (ref 4–11)

## 2024-12-17 PROCEDURE — 93010 ELECTROCARDIOGRAM REPORT: CPT

## 2024-12-17 PROCEDURE — 93005 ELECTROCARDIOGRAM TRACING: CPT

## 2024-12-17 PROCEDURE — 93000 ELECTROCARDIOGRAM COMPLETE: CPT | Performed by: PHYSICIAN ASSISTANT

## 2024-12-17 PROCEDURE — 80048 BASIC METABOLIC PNL TOTAL CA: CPT | Performed by: STUDENT IN AN ORGANIZED HEALTH CARE EDUCATION/TRAINING PROGRAM

## 2024-12-17 PROCEDURE — 85025 COMPLETE CBC W/AUTO DIFF WBC: CPT | Performed by: STUDENT IN AN ORGANIZED HEALTH CARE EDUCATION/TRAINING PROGRAM

## 2024-12-17 PROCEDURE — 99215 OFFICE O/P EST HI 40 MIN: CPT | Performed by: PHYSICIAN ASSISTANT

## 2024-12-17 PROCEDURE — 99284 EMERGENCY DEPT VISIT MOD MDM: CPT

## 2024-12-17 PROCEDURE — 84484 ASSAY OF TROPONIN QUANT: CPT | Performed by: STUDENT IN AN ORGANIZED HEALTH CARE EDUCATION/TRAINING PROGRAM

## 2024-12-17 PROCEDURE — 36415 COLL VENOUS BLD VENIPUNCTURE: CPT

## 2024-12-17 PROCEDURE — 99285 EMERGENCY DEPT VISIT HI MDM: CPT

## 2024-12-17 PROCEDURE — 71045 X-RAY EXAM CHEST 1 VIEW: CPT | Performed by: STUDENT IN AN ORGANIZED HEALTH CARE EDUCATION/TRAINING PROGRAM

## 2024-12-17 RX ORDER — FAMOTIDINE 20 MG/1
20 TABLET, FILM COATED ORAL 2 TIMES DAILY PRN
Qty: 30 TABLET | Refills: 0 | Status: SHIPPED | OUTPATIENT
Start: 2024-12-17 | End: 2025-01-16

## 2024-12-17 NOTE — ED INITIAL ASSESSMENT (HPI)
Abbey was sent from [facility] for CP since 4a, subsided since 8am. Patients only complaint at this time is feeling weaker than normal this morning. No current pain  Patient is polish speaking only, daughter prefers to translate.

## 2024-12-17 NOTE — ED PROVIDER NOTES
[place] Emergency Department Note  Patient: Abbey Rodriguez Age: 84 year old Sex: female      MRN: [id number]  : 10/26/1940    Patient Seen in: [facility] Emergency Department    History     Chief Complaint   Patient presents with    Chest Pain Angina     Stated Complaint: chest pain    History obtained from: Patient's Daughter     patient is a 84-year-old female with past medical history of hypertension, hyperlipidemia presenting today for evaluation of left upper quadrant abdominal pain.  Patient's daughter states that the patient woke up at 4 AM this morning with a sharp pain in the left upper quadrant of her abdomen underneath her left breast.  States that pain lasted for approximately 4 hours before resolving spontaneously.  She denies any associate chest pain or shortness of breath.  Denies any nausea or vomiting.  Denies any history of similar symptoms.  No symptoms currently.    Review of Systems:  Review of Systems  Positive for stated complaint: chest pain. Constitutional and vital signs reviewed. All other systems reviewed and negative except as noted above.    Patient History:  Past Medical History:    Essential hypertension    Hyperlipidemia    Nodular basal cell carcinoma (BCC)    Left cheek       History reviewed. No pertinent surgical history.     No family history on file.    Specific Social Determinants of Health:   Social History     Socioeconomic History    Marital status:    Tobacco Use    Smoking status: Never    Smokeless tobacco: Never   Vaping Use    Vaping status: Never Used   Substance and Sexual Activity    Alcohol use: Yes     Alcohol/week: 0.0 standard drinks of alcohol     Comment: Social    Drug use: No   Other Topics Concern    Caffeine Concern Yes     Comment: 2 cups of coffee daily    Exercise Yes     Comment: walks daily    Reaction to local anesthetic No    Pt has a pacemaker No    Pt has a defibrillator No           PSFH elements reviewed from today and agreed  except as otherwise stated in HPI.    Physical Exam     ED Triage Vitals   BP 12/17/24 0933 140/79   Pulse 12/17/24 0932 65   Resp 12/17/24 0932 18   Temp 12/17/24 0932 98 °F (36.7 °C)   Temp src 12/17/24 0932 Temporal   SpO2 12/17/24 0932 96 %   O2 Device 12/17/24 0932 None (Room air)       Current:/79   Pulse 70   Temp 98 °F (36.7 °C) (Temporal)   Resp 12   Ht 162.6 cm (5' 4\")   Wt 77.1 kg   SpO2 95%   BMI 29.18 kg/m²         Physical Exam  Constitutional:       General: She is not in acute distress.  HENT:      Head: Normocephalic and atraumatic.      Mouth/Throat:      Mouth: Mucous membranes are moist.   Eyes:      Extraocular Movements: Extraocular movements intact.   Cardiovascular:      Rate and Rhythm: Normal rate and regular rhythm.      Heart sounds: Normal heart sounds.   Pulmonary:      Effort: Pulmonary effort is normal. No respiratory distress.      Breath sounds: Normal breath sounds.   Abdominal:      Palpations: Abdomen is soft.      Tenderness: There is no abdominal tenderness.   Skin:     General: Skin is warm and dry.      Capillary Refill: Capillary refill takes less than 2 seconds.      Findings: No rash.   Neurological:      General: No focal deficit present.      Mental Status: She is alert and oriented to person, place, and time.   Psychiatric:         Mood and Affect: Mood normal.         Behavior: Behavior normal.         ED Course   Labs:   Labs Reviewed   BASIC METABOLIC PANEL (8) - Normal   TROPONIN I HIGH SENSITIVITY - Normal   TROPONIN I HIGH SENSITIVITY - Normal   CBC WITH DIFFERENTIAL WITH PLATELET   RAINBOW DRAW BLUE     Radiology findings:  I personally reviewed the images.   XR CHEST AP PORTABLE  (CPT=71045)    Result Date: 12/17/2024  CONCLUSION: No radiographic evidence of acute cardiopulmonary abnormality.    elm-remote    Dictated by (CST): Florin Potts MD on 12/17/2024 at 10:39 AM     Finalized by (CST): Florin Potts MD on 12/17/2024 at 10:42 AM           EKG  as interpreted by me: Ventricular rate 70, normal sinus rhythm, left axis deviation, no DE interval prolongation, narrow QRS, QTc 447 ms, no ST segment elevations or depressions, no abnormal T wave inversions  Cardiac Monitor: Interpreted by me.   Pulse Readings from Last 1 Encounters:   12/17/24 70   , sinus,     External non-ED records reviewed independently by me: PCP note from 5/2/2024 reviewed confirming patient has past medical history of hypertension and hyperlipidemia.    MDM   84-year-old female with a past medical history of hypertension, hyperlipidemia presenting today for evaluation of left-sided chest/abdominal pain.  Upon arrival to the emergency department, patient currently asymptomatic.  Vitals are within normal limits.  She is well-appearing and in no acute distress.  Lungs clear to auscultation bilaterally with no increased work of breathing.  Abdomen without tenderness.    Differential diagnoses considered includes, but is not limited to: GERD, acid reflux, pleuritis, pleurisy, pneumonia, ACS, atelectasis, constipation    Will obtain the following tests: CBC, BMP, troponin, chest x-ray, EKG  Please see ED course for my independent review of these tests/imaging results.    Initial Medications/Therapeutics administered: None    Chronic conditions affecting care: Hypertension, hyperlipidemia       Labs overall reassuring with negative troponin x 2.  EKG with no acute ischemic changes.  Independently reviewed the chest x-ray images that show no evidence of pneumothorax.  Agree with radiology read above.  Patient remained asymptomatic throughout stay in the emergency department.  Unclear etiology as to her symptoms however low risk for ACS with heart score of 3 only given age and cardiac risk factors of hypertension and hyperlipidemia.  Otherwise nonsuspicious history, negative troponin, nonischemic EKG.  No clinical concern for pulmonary embolism.  Suspect possibly secondary to gastritis/GERD given  symptoms occurred early morning and resolved throughout the day.  Return precautions including any recurrence of pain, difficulty breathing, vomiting, or fevers were discussed and all questions were answered.  Instructed to follow-up closely with PCP for discussion of outpatient stress should symptoms.  Patient expressed understanding and agreement with plan.      Disposition and Plan     Clinical Impression:  1. Left upper quadrant abdominal pain        Disposition:  Discharge    Follow-up:  Amalia Shipman MD  [address] [place] [id number]  [phone]    Schedule an appointment as soon as possible for a visit in 2 day(s)  As needed, If symptoms worsen      Medications Prescribed:  Discharge Medication List as of 12/17/2024  1:20 PM        START taking these medications    Details   famotidine (PEPCID) 20 MG Oral Tab Take 1 tablet (20 mg total) by mouth 2 (two) times daily as needed for Heartburn., Normal, Disp-30 tablet, R-0               This note may have been created using voice dictation technology and may include inadvertent errors.      Nika Cedillo MD  Emergency Medicine

## 2024-12-17 NOTE — DISCHARGE INSTRUCTIONS
Thank you for seeking care at [facility] Emergency Department.  You have been seen and evaluated for chest discomfort     We discussed the results of your workup. Your labs, EKG, and chest x-ray did not show severe findings   Please read the instructions provided   If given prescriptions, take as instructed    Remember, your care process does not end after your visit today. Please follow-up with your doctor within 1-2 days for a follow-up check to ensure you are  improving, to see if you need any further evaluation/testing, or to evaluate for any alternate diagnoses.    Please return to the emergency department if you develop severe and persistent chest pain, difficulty breathing, back pain, abdominal pain, lightheadedness or dizziness, passing out, new swelling, fevers, coughing up blood, feeling ill, not acting normally, worsening symptoms, or for any other concerns as these can be signs of a medical emergency.     We hope you feel better.

## 2024-12-17 NOTE — ED PROVIDER NOTES
Chief Complaint   Patient presents with    Chest Pain       HPI:     Abbey Rodriguez is a 84 year old female who presents for evaluation of left chest pain developing around 4 AM this morning waking her from sleep.  Notes pain sharp and localized with associated shortness of breath lasting over 4 hours resolving within the last hour without alleviating or aggravating factors.  Denies associated rash headache dizziness cough congestion sore throat neck pain active chest pain active shortness of breath abdominal pain vomiting diarrhea dysuria hematuria vaginal bleeding or discharge.  Cardiac risk factors: Age hypertension hyperlipidemia, family history Brother with MI.      PFSH    PFSH asessment screens reviewed and agree.  Nurses notes reviewed I agree with documentation.    History reviewed. No pertinent family history.  Family history reviewed with patient/caregiver and is not pertinent to presenting problem.  Social History     Socioeconomic History    Marital status:      Spouse name: Not on file    Number of children: Not on file    Years of education: Not on file    Highest education level: Not on file   Occupational History    Not on file   Tobacco Use    Smoking status: Never    Smokeless tobacco: Never   Vaping Use    Vaping status: Never Used   Substance and Sexual Activity    Alcohol use: Yes     Alcohol/week: 0.0 standard drinks of alcohol     Comment: Social    Drug use: No    Sexual activity: Not on file   Other Topics Concern     Service Not Asked    Blood Transfusions Not Asked    Caffeine Concern Yes     Comment: 2 cups of coffee daily    Occupational Exposure Not Asked    Hobby Hazards Not Asked    Sleep Concern Not Asked    Stress Concern Not Asked    Weight Concern Not Asked    Special Diet Not Asked    Back Care Not Asked    Exercise Yes     Comment: walks daily    Bike Helmet Not Asked    Seat Belt Not Asked    Self-Exams Not Asked    Grew up on a farm Not Asked    History of  tanning Not Asked    Outdoor occupation Not Asked    Breast feeding Not Asked    Reaction to local anesthetic No    Pt has a pacemaker No    Pt has a defibrillator No   Social History Narrative    Not on file     Social Drivers of Health     Financial Resource Strain: Not on file   Food Insecurity: Not on file   Transportation Needs: Not on file   Physical Activity: Not on file   Stress: Not on file   Social Connections: Not on file   Housing Stability: Not on file         ROS:   Positive for stated complaint: Chest Pain shortness of breath.  All other systems reviewed and negative except as noted above.  Constitutional and Vital Signs Reviewed.      Physical Exam:     Findings:    /82   Pulse 72   Temp 97.3 °F (36.3 °C) (Oral)   Resp 20   SpO2 95%   GENERAL: well developed, well nourished, well hydrated, no distress  SKIN: good skin turgor, no obvious rashes  NECK: supple, no adenopathy  CARDIO: NO Chest wall tenderness or visualized lesion or vesicular pattern.  RRR without murmur  EXTREMITIES: no cyanosis or edema. KELLY without difficulty  GI: soft, non-tender, normal bowel sounds  HEAD: normocephalic, atraumatic  EYES: sclera non icteric bilateral, conjunctiva clear  EARS: TMs clear bilaterally. Canals clear.  NOSE: nasal turbinates: pink, normal mucosa  THROAT: clear, without exudates, uvula midline, and airway patent  LUNGS: clear to auscultation bilaterally; no rales, rhonchi, or wheezes  NEURO: No focal deficits  PSYCH: Alert and oriented x3.  Answering questions appropriately.  Mood appropriate.    MDM/Assessment/Plan:   Orders for this encounter:    Orders Placed This Encounter    EKG 12 Lead     Order Specific Question:   Release to patient     Answer:   Immediate       Labs performed this visit:  Recent Results (from the past 10 hours)   EKG 12 Lead    Collection Time: 12/17/24  8:56 AM   Result Value Ref Range    Ventricular rate 62 BPM    Atrial rate 62 BPM    P-R Interval 176 ms    QRS  Duration 112 ms    Q-T Interval 416 ms    QTC Calculation (Bezet) 422 ms    P Axis -15 degrees    R Axis -28 degrees    T Axis -22 degrees       MDM:  EKG with motion artifact 67 bpm questionable ST changes along V2, otherwise no ST elevation or depression.  QT of 414    Patient and family educated on EKG showing potential concerns declining EMS requesting private vehicle to the ER for more prompt evaluation based on duration of symptoms and risk factors.  Patient is lucid and decisional.  Dr. Massey notified.    Diagnosis:    ICD-10-CM    1. Acute chest pain  R07.9           All results reviewed and discussed with patient.  See AVS for detailed discharge instructions for your condition today.    Follow Up with:  No follow-up provider specified.

## 2025-03-31 ENCOUNTER — HOSPITAL ENCOUNTER (EMERGENCY)
Facility: HOSPITAL | Age: 85
Discharge: HOME | End: 2025-03-31
Attending: EMERGENCY MEDICINE
Payer: MEDICARE

## 2025-03-31 ENCOUNTER — APPOINTMENT (OUTPATIENT)
Dept: MRI IMAGING | Facility: HOSPITAL | Age: 85
End: 2025-03-31
Attending: EMERGENCY MEDICINE
Payer: MEDICARE

## 2025-03-31 VITALS
BODY MASS INDEX: 29.02 KG/M2 | HEART RATE: 70 BPM | DIASTOLIC BLOOD PRESSURE: 93 MMHG | OXYGEN SATURATION: 95 % | RESPIRATION RATE: 20 BRPM | TEMPERATURE: 98 F | WEIGHT: 170 LBS | HEIGHT: 64 IN | SYSTOLIC BLOOD PRESSURE: 174 MMHG

## 2025-03-31 DIAGNOSIS — N30.00 ACUTE CYSTITIS WITHOUT HEMATURIA: Primary | ICD-10-CM

## 2025-03-31 DIAGNOSIS — R03.0 ELEVATED BLOOD PRESSURE READING: ICD-10-CM

## 2025-03-31 LAB
ANION GAP SERPL CALC-SCNC: 6 MMOL/L (ref 0–18)
BASOPHILS # BLD AUTO: 0.05 X10(3) UL (ref 0–0.2)
BASOPHILS NFR BLD AUTO: 0.7 %
BILIRUB UR QL: NEGATIVE
BUN BLD-MCNC: 17 MG/DL (ref 9–23)
BUN/CREAT SERPL: 21.3 (ref 10–20)
CALCIUM BLD-MCNC: 9.6 MG/DL (ref 8.7–10.4)
CHLORIDE SERPL-SCNC: 106 MMOL/L (ref 98–112)
CLARITY UR: CLEAR
CO2 SERPL-SCNC: 27 MMOL/L (ref 21–32)
COLOR UR: COLORLESS
CREAT BLD-MCNC: 0.8 MG/DL
DEPRECATED RDW RBC AUTO: 38.8 FL (ref 35.1–46.3)
EGFRCR SERPLBLD CKD-EPI 2021: 73 ML/MIN/1.73M2 (ref 60–?)
EOSINOPHIL # BLD AUTO: 0.1 X10(3) UL (ref 0–0.7)
EOSINOPHIL NFR BLD AUTO: 1.3 %
ERYTHROCYTE [DISTWIDTH] IN BLOOD BY AUTOMATED COUNT: 11.9 % (ref 11–15)
GLUCOSE BLD-MCNC: 101 MG/DL (ref 70–99)
GLUCOSE BLDC GLUCOMTR-MCNC: 92 MG/DL (ref 70–99)
GLUCOSE UR-MCNC: NORMAL MG/DL
HCT VFR BLD AUTO: 39.3 %
HGB BLD-MCNC: 13.4 G/DL
HGB UR QL STRIP.AUTO: NEGATIVE
IMM GRANULOCYTES # BLD AUTO: 0.01 X10(3) UL (ref 0–1)
IMM GRANULOCYTES NFR BLD: 0.1 %
KETONES UR-MCNC: NEGATIVE MG/DL
LEUKOCYTE ESTERASE UR QL STRIP.AUTO: 25
LYMPHOCYTES # BLD AUTO: 2.39 X10(3) UL (ref 1–4)
LYMPHOCYTES NFR BLD AUTO: 32.2 %
MCH RBC QN AUTO: 30.6 PG (ref 26–34)
MCHC RBC AUTO-ENTMCNC: 34.1 G/DL (ref 31–37)
MCV RBC AUTO: 89.7 FL
MONOCYTES # BLD AUTO: 0.44 X10(3) UL (ref 0.1–1)
MONOCYTES NFR BLD AUTO: 5.9 %
NEUTROPHILS # BLD AUTO: 4.43 X10 (3) UL (ref 1.5–7.7)
NEUTROPHILS # BLD AUTO: 4.43 X10(3) UL (ref 1.5–7.7)
NEUTROPHILS NFR BLD AUTO: 59.8 %
NITRITE UR QL STRIP.AUTO: NEGATIVE
OSMOLALITY SERPL CALC.SUM OF ELEC: 290 MOSM/KG (ref 275–295)
PH UR: 6.5 [PH] (ref 5–8)
PLATELET # BLD AUTO: 250 10(3)UL (ref 150–450)
POTASSIUM SERPL-SCNC: 4 MMOL/L (ref 3.5–5.1)
PROT UR-MCNC: NEGATIVE MG/DL
RBC # BLD AUTO: 4.38 X10(6)UL
SODIUM SERPL-SCNC: 139 MMOL/L (ref 136–145)
SP GR UR STRIP: <1.005 (ref 1–1.03)
UROBILINOGEN UR STRIP-ACNC: NORMAL
WBC # BLD AUTO: 7.4 X10(3) UL (ref 4–11)

## 2025-03-31 PROCEDURE — 80048 BASIC METABOLIC PNL TOTAL CA: CPT | Performed by: EMERGENCY MEDICINE

## 2025-03-31 PROCEDURE — 81001 URINALYSIS AUTO W/SCOPE: CPT | Performed by: EMERGENCY MEDICINE

## 2025-03-31 PROCEDURE — 85025 COMPLETE CBC W/AUTO DIFF WBC: CPT | Performed by: EMERGENCY MEDICINE

## 2025-03-31 PROCEDURE — 87186 SC STD MICRODIL/AGAR DIL: CPT | Performed by: EMERGENCY MEDICINE

## 2025-03-31 PROCEDURE — 87088 URINE BACTERIA CULTURE: CPT | Performed by: EMERGENCY MEDICINE

## 2025-03-31 PROCEDURE — 82962 GLUCOSE BLOOD TEST: CPT

## 2025-03-31 PROCEDURE — 99284 EMERGENCY DEPT VISIT MOD MDM: CPT

## 2025-03-31 PROCEDURE — 70551 MRI BRAIN STEM W/O DYE: CPT | Performed by: EMERGENCY MEDICINE

## 2025-03-31 PROCEDURE — 87086 URINE CULTURE/COLONY COUNT: CPT | Performed by: EMERGENCY MEDICINE

## 2025-03-31 PROCEDURE — 96374 THER/PROPH/DIAG INJ IV PUSH: CPT

## 2025-03-31 RX ORDER — ENALAPRILAT 1.25 MG/ML
1.25 INJECTION INTRAVENOUS ONCE
Status: COMPLETED | OUTPATIENT
Start: 2025-03-31 | End: 2025-03-31

## 2025-03-31 RX ORDER — CEPHALEXIN 500 MG/1
500 CAPSULE ORAL 3 TIMES DAILY
Qty: 15 CAPSULE | Refills: 0 | Status: SHIPPED | OUTPATIENT
Start: 2025-03-31 | End: 2025-04-05

## 2025-03-31 NOTE — ED INITIAL ASSESSMENT (HPI)
PT to ED, steady gait, alert and orientated x4. Speaks Polish, daughter provided interpretation as requested by patient.   Daughter reporting confusion since 1600. Patient went to neighbors house, unable to make phone call, neighbor called family. Daughter reporting difficulty with short term memory. Daughter reports baseline is a&ox4 and was a&ox4 at approx 1300.   [facility]

## 2025-04-01 NOTE — ED PROVIDER NOTES
Patient Seen in: [facility] Emergency Department    History     Chief Complaint   Patient presents with    Altered Mental Status     Stated Complaint: AMS    HPI    Patient complains brought in with family.  Reportedly about 3 hours ago she went to the neighbors door across the [place] was very confused.  States she kept asking what was going on she was having a hard time communicating although her speech was not garbled.  Family states they were called came over on their arrival she seemed to be improving at this point she seems pretty much back to her baseline.  They state there was never any focal weakness or facial droop.  Alleviating factors: none  Exacerbating factors: none    Past Medical History:    Essential hypertension    Hyperlipidemia    Nodular basal cell carcinoma (BCC)    Left cheek       History reviewed. No pertinent surgical history.         No family history on file.    Social History     Socioeconomic History    Marital status:    Tobacco Use    Smoking status: Never    Smokeless tobacco: Never   Vaping Use    Vaping status: Never Used   Substance and Sexual Activity    Alcohol use: Yes     Alcohol/week: 0.0 standard drinks of alcohol     Comment: Social    Drug use: No   Other Topics Concern    Caffeine Concern Yes     Comment: 2 cups of coffee daily    Exercise Yes     Comment: walks daily    Reaction to local anesthetic No    Pt has a pacemaker No    Pt has a defibrillator No       Review of Systems    Positive for stated complaint: AMS  Other systems are as noted in HPI.  Constitutional and vital signs reviewed.      All other systems reviewed and negative except as noted above.    PSFH elements reviewed from today and agreed except as otherwise stated in HPI.    Physical Exam     ED Triage Vitals [03/31/25 1733]   /82   Pulse 74   Resp 20   Temp 98 °F (36.7 °C)   Temp src Oral   SpO2 98 %   O2 Device None (Room air)       Current:BP (!) 175/94   Pulse 72   Temp 98 °F  (36.7 °C) (Oral)   Resp 20   Ht 162.6 cm (5' 4\")   Wt 77.1 kg   SpO2 97%   BMI 29.18 kg/m²    PULSE OX nl  GENERAL: alert to person, general location but confused on date, not really off from baseline  HEAD: normocephalic, atraumatic,   EYES: PERRLA, EOMI, conj sclera clear  THROAT: mmm, no lesions  NECK: supple, no meningeal signs  LUNGS: no resp distress, cta bilateral  CARDIO: RRR without murmur  GI: abdomen is soft and non tender, no masses, nl bowel sounds   EXTREMITIES: from, 5/5 strength in all 4 ext, no edema  NEURO: alert and oiented *2, 2-12 intact, no focal deficit noted  SKIN: good skin turgor, no  rashes  PSYCH: calm, cooperative,    Differential includes:tia vs. Hypoglycemia vs. Tga vs. stroke    ED Course     Labs Reviewed   URINALYSIS WITH CULTURE REFLEX - Abnormal; Notable for the following components:       Result Value    Urine Color Colorless (*)     Spec Gravity <1.005 (*)     Leukocyte Esterase Urine 25 (*)     Bacteria Urine Rare (*)     Squamous Epi. Cells Few (*)     All other components within normal limits   BASIC METABOLIC PANEL (8) - Abnormal; Notable for the following components:    Glucose 101 (*)     BUN/CREA Ratio 21.3 (*)     All other components within normal limits   POCT GLUCOSE - Normal   CBC WITH DIFFERENTIAL WITH PLATELET   URINE CULTURE, ROUTINE       MDM       Cardiac Monitor:   Pulse Readings from Last 1 Encounters:   03/31/25 72   , sinus, 77  interpreted by me.    Radiology findings:   MRI BRAIN WO ACUTE (3) SEQUENCE (CPT=70551)    Result Date: 3/31/2025  CONCLUSION:  1. No acute infarct or hemorrhage. 2. Moderate changes of chronic small vessel disease in cerebral white matter.    Dictated by (CST): Stew Aden MD on 3/31/2025 at 9:24 PM     Finalized by (CST): Stew Aden MD on 3/31/2025 at 9:26 PM               Medical Decision Making  Problems Addressed:  Acute cystitis without hematuria: acute illness or injury     Details: Rx for abx given  Elevated blood  pressure reading: acute illness or injury     Details: Not super compliant with meds, bp up and down at home, rx for lisinopril 10mg daily dw family can double dose if bp elevated.    Amount and/or Complexity of Data Reviewed  Independent Historian: caregiver  Labs: ordered. Decision-making details documented in ED Course.  Radiology: ordered. Decision-making details documented in ED Course.    Risk  Prescription drug management.        Disposition and Plan     Clinical Impression:  1. Acute cystitis without hematuria    2. Elevated blood pressure reading        Disposition:  Discharge    Follow-up:  Meghna Acevedo MD  [address] [place] [id number]  [phone]    Follow up        Medications Prescribed:  Current Discharge Medication List        START taking these medications    Details   cephALEXin 500 MG Oral Cap Take 1 capsule (500 mg total) by mouth 3 (three) times daily for 5 days.  Qty: 15 capsule, Refills: 0

## 2025-04-02 RX ORDER — SULFAMETHOXAZOLE AND TRIMETHOPRIM 800; 160 MG/1; MG/1
1 TABLET ORAL 2 TIMES DAILY
Qty: 14 TABLET | Refills: 0 | Status: SHIPPED | OUTPATIENT
Start: 2025-04-02 | End: 2025-04-09

## 2025-05-08 ENCOUNTER — OFFICE VISIT (OUTPATIENT)
Dept: INTERNAL MEDICINE CLINIC | Facility: CLINIC | Age: 85
End: 2025-05-08
Payer: MEDICARE

## 2025-05-08 ENCOUNTER — LAB ENCOUNTER (OUTPATIENT)
Dept: LAB | Age: 85
End: 2025-05-08
Attending: INTERNAL MEDICINE
Payer: MEDICARE

## 2025-05-08 VITALS
SYSTOLIC BLOOD PRESSURE: 120 MMHG | BODY MASS INDEX: 30.05 KG/M2 | HEIGHT: 64 IN | DIASTOLIC BLOOD PRESSURE: 70 MMHG | WEIGHT: 176 LBS | HEART RATE: 67 BPM

## 2025-05-08 DIAGNOSIS — R00.1 BRADYCARDIA: ICD-10-CM

## 2025-05-08 DIAGNOSIS — I10 ESSENTIAL HYPERTENSION: Primary | ICD-10-CM

## 2025-05-08 LAB
ALBUMIN SERPL-MCNC: 4.4 G/DL (ref 3.2–4.8)
ALBUMIN/GLOB SERPL: 1.9 {RATIO} (ref 1–2)
ALP LIVER SERPL-CCNC: 60 U/L (ref 55–142)
ALT SERPL-CCNC: 17 U/L (ref 10–49)
ANION GAP SERPL CALC-SCNC: 6 MMOL/L (ref 0–18)
AST SERPL-CCNC: 20 U/L (ref ?–34)
ATRIAL RATE: 62 BPM
BILIRUB SERPL-MCNC: 1.3 MG/DL (ref 0.2–1.1)
BUN BLD-MCNC: 12 MG/DL (ref 9–23)
BUN/CREAT SERPL: 15.4 (ref 10–20)
CALCIUM BLD-MCNC: 9.7 MG/DL (ref 8.7–10.4)
CHLORIDE SERPL-SCNC: 107 MMOL/L (ref 98–112)
CO2 SERPL-SCNC: 28 MMOL/L (ref 21–32)
CREAT BLD-MCNC: 0.78 MG/DL (ref 0.55–1.02)
EGFRCR SERPLBLD CKD-EPI 2021: 75 ML/MIN/1.73M2 (ref 60–?)
FASTING STATUS PATIENT QL REPORTED: YES
GLOBULIN PLAS-MCNC: 2.3 G/DL (ref 2–3.5)
GLUCOSE BLD-MCNC: 88 MG/DL (ref 70–99)
MAGNESIUM SERPL-MCNC: 1.9 MG/DL (ref 1.6–2.6)
OSMOLALITY SERPL CALC.SUM OF ELEC: 291 MOSM/KG (ref 275–295)
P AXIS: 42 DEGREES
P-R INTERVAL: 182 MS
POTASSIUM SERPL-SCNC: 4.4 MMOL/L (ref 3.5–5.1)
PROT SERPL-MCNC: 6.7 G/DL (ref 5.7–8.2)
Q-T INTERVAL: 434 MS
QRS DURATION: 108 MS
QTC CALCULATION (BEZET): 440 MS
R AXIS: -34 DEGREES
SODIUM SERPL-SCNC: 141 MMOL/L (ref 136–145)
T AXIS: 0 DEGREES
T4 FREE SERPL-MCNC: 1.1 NG/DL (ref 0.8–1.7)
TSI SER-ACNC: 1.24 UIU/ML (ref 0.55–4.78)
VENTRICULAR RATE: 62 BPM

## 2025-05-08 PROCEDURE — 99214 OFFICE O/P EST MOD 30 MIN: CPT | Performed by: INTERNAL MEDICINE

## 2025-05-08 PROCEDURE — 93000 ELECTROCARDIOGRAM COMPLETE: CPT | Performed by: INTERNAL MEDICINE

## 2025-05-08 PROCEDURE — 36415 COLL VENOUS BLD VENIPUNCTURE: CPT | Performed by: INTERNAL MEDICINE

## 2025-05-08 PROCEDURE — 1159F MED LIST DOCD IN RCRD: CPT | Performed by: INTERNAL MEDICINE

## 2025-05-08 PROCEDURE — 83735 ASSAY OF MAGNESIUM: CPT | Performed by: INTERNAL MEDICINE

## 2025-05-08 PROCEDURE — 84439 ASSAY OF FREE THYROXINE: CPT | Performed by: INTERNAL MEDICINE

## 2025-05-08 PROCEDURE — 84443 ASSAY THYROID STIM HORMONE: CPT | Performed by: INTERNAL MEDICINE

## 2025-05-08 PROCEDURE — 80053 COMPREHEN METABOLIC PANEL: CPT | Performed by: INTERNAL MEDICINE

## 2025-05-08 NOTE — PROGRESS NOTES
Subjective:   Abbey Rodriguez is a 84 year old female who presents for Fatigue     Patient here for evaluation of episodic low pulse rate with some associated fatigue and dizziness.  Is on no b blockers.  Had an episodic elevated high BP associated with some disorientation. Has had no syncope or pre or chest pains.    History/Other:    Chief Complaint Reviewed and Verified  No Further Nursing Notes to   Review  Medications Reviewed         Tobacco: non smoker  She has never smoked tobacco.    Current Medications[1]      Review of Systems:  Review of Systems   Constitutional:  Positive for fatigue.   Respiratory:  Negative for shortness of breath.    Cardiovascular:  Negative for chest pain, palpitations and leg swelling.   Gastrointestinal: Negative.    Neurological:  Negative for dizziness and numbness.   Psychiatric/Behavioral: Negative.           Objective:   /70   Pulse 67   Ht 5' 4\" (1.626 m)   Wt 176 lb (79.8 kg)   BMI 30.21 kg/m²  Estimated body mass index is 30.21 kg/m² as calculated from the following:    Height as of this encounter: 5' 4\" (1.626 m).    Weight as of this encounter: 176 lb (79.8 kg).  Physical Exam  HENT:      Head: Normocephalic and atraumatic.   Eyes:      General: No scleral icterus.     Pupils: Pupils are equal, round, and reactive to light.   Neck:      Vascular: No carotid bruit.   Cardiovascular:      Rate and Rhythm: Normal rate and regular rhythm.   Pulmonary:      Effort: Pulmonary effort is normal.      Breath sounds: Normal breath sounds.   Abdominal:      Palpations: Abdomen is soft.   Musculoskeletal:      Cervical back: Neck supple.      Right lower leg: No edema.      Left lower leg: No edema.   Skin:     General: Skin is warm.      Findings: Lesion (skin tag right breast) present.   Neurological:      General: No focal deficit present.      Mental Status: She is alert and oriented to person, place, and time.   Psychiatric:         Thought Content: Thought content  normal.           Assessment & Plan:   1. Essential hypertension (Primary) controlled to goal  2. Bradycardia - 12 leqad EKG -   Nsr no acute st / t wave changes  -     Cardio Referral - Internal  could be sick sinus syndrome  -     EKG In-Office [[id number]]        No follow-ups on file.    Amalia Shipman MD, 5/8/2025, 10:09 AM        [1]   Current Outpatient Medications   Medication Sig Dispense Refill    lisinopril 10 MG Oral Tab Take 1 tablet (10 mg total) by mouth daily. 90 tablet 3    Clobetasol Prop Emollient Base (CLOBETASOL PROPIONATE E) 0.05 % External Cream APPLY EXTERNALLY TO THE AFFECTED AREA TWICE DAILY 30 g 03    ATORVASTATIN 10 MG Oral Tab TAKE 1 TABLET BY MOUTH EVERY DAY 90 tablet 4    aspirin 81 MG Oral Tab Take 1 tablet (81 mg total) by mouth daily.

## 2025-05-12 ENCOUNTER — HOSPITAL ENCOUNTER (OUTPATIENT)
Dept: CV DIAGNOSTICS | Facility: HOSPITAL | Age: 85
Discharge: HOME | End: 2025-05-12
Attending: INTERNAL MEDICINE
Payer: MEDICARE

## 2025-05-12 DIAGNOSIS — R00.1 BRADYCARDIA: ICD-10-CM

## 2025-05-12 PROCEDURE — 93227 XTRNL ECG REC<48 HR R&I: CPT | Performed by: INTERNAL MEDICINE

## 2025-05-12 PROCEDURE — 93225 XTRNL ECG REC<48 HRS REC: CPT | Performed by: INTERNAL MEDICINE

## 2025-05-20 RX ORDER — ATORVASTATIN CALCIUM 10 MG/1
10 TABLET, FILM COATED ORAL DAILY
Qty: 90 TABLET | Refills: 3 | Status: SHIPPED | OUTPATIENT
Start: 2025-05-20

## 2025-05-20 NOTE — TELEPHONE ENCOUNTER
Patients daughterBruna (authorized contact), walked in to the office to request a refill of the following medication:    ATORVASTATIN 10 MG Oral Tab     Patient was last seen in the office by Dr. Shipmna on 05/08/2025; no future appointments with Dr. Shipman are scheduled.    If approved, please send refill request to:    [facility]/pharmacy #[id number] - [place], [place] - [address] AT [facility], [phone], [phone]     Any questions or concerns, please call patients Bruna rebollar, at:    [phone]       KG

## 2025-08-29 ENCOUNTER — OFFICE VISIT (OUTPATIENT)
Dept: INTERNAL MEDICINE CLINIC | Facility: CLINIC | Age: 85
End: 2025-08-29

## 2025-08-29 VITALS
DIASTOLIC BLOOD PRESSURE: 60 MMHG | BODY MASS INDEX: 29.88 KG/M2 | SYSTOLIC BLOOD PRESSURE: 120 MMHG | HEIGHT: 64 IN | WEIGHT: 175 LBS | OXYGEN SATURATION: 98 % | HEART RATE: 70 BPM

## 2025-08-29 DIAGNOSIS — I10 ESSENTIAL HYPERTENSION: ICD-10-CM

## 2025-08-29 DIAGNOSIS — Z00.00 MEDICARE ANNUAL WELLNESS VISIT, SUBSEQUENT: Primary | ICD-10-CM

## 2025-08-29 DIAGNOSIS — Z85.828 HX OF SKIN CANCER, BASAL CELL: ICD-10-CM

## 2025-08-29 DIAGNOSIS — E78.2 MIXED HYPERLIPIDEMIA: ICD-10-CM

## 2025-08-29 RX ORDER — ATORVASTATIN CALCIUM 10 MG/1
10 TABLET, FILM COATED ORAL DAILY
Qty: 90 TABLET | Refills: 3 | Status: SHIPPED | OUTPATIENT
Start: 2025-08-29

## 2025-08-29 RX ORDER — HALOBETASOL PROPIONATE 0.5 MG/G
CREAM TOPICAL
Qty: 15 G | Refills: 3 | Status: SHIPPED | OUTPATIENT
Start: 2025-08-29

## 2025-08-29 RX ORDER — LISINOPRIL 10 MG/1
10 TABLET ORAL DAILY
Qty: 90 TABLET | Refills: 3 | Status: SHIPPED | OUTPATIENT
Start: 2025-08-29

## (undated) NOTE — MR AVS SNAPSHOT
[address] at [address] [facility] [id number]  [phone]               Thank you for choosing us for your health care visit with Kevin Mcallister MD.  We are glad to serve you and happy to provide you with [facility] [address] [place], [address] [facility], [address] [place] [address]. [address] [place] [address], [address] , [facility]    It is the patient's responsibility to [id number] [facility] discharge instructions in [facility] by going to Visits < Admission Summaries. If you've been to the Emergency Department or your doctor's office, you can view your past visit information in [employer] by going to Visits < Visit Summaries. [facility] questions?